# Patient Record
Sex: MALE | Race: WHITE | Employment: FULL TIME | URBAN - METROPOLITAN AREA
[De-identification: names, ages, dates, MRNs, and addresses within clinical notes are randomized per-mention and may not be internally consistent; named-entity substitution may affect disease eponyms.]

---

## 2017-04-25 PROBLEM — Z87.11 H/O GASTRIC ULCER: Status: ACTIVE | Noted: 2017-04-25

## 2017-04-25 PROBLEM — Z98.61 HISTORY OF PTCA: Status: ACTIVE | Noted: 2017-04-25

## 2017-04-25 PROBLEM — E78.5 HYPERLIPIDEMIA: Status: ACTIVE | Noted: 2017-04-25

## 2017-04-25 PROBLEM — Z86.2 H/O: IRON DEFICIENCY ANEMIA: Status: ACTIVE | Noted: 2017-04-25

## 2017-04-25 PROBLEM — I10 ESSENTIAL HYPERTENSION: Status: ACTIVE | Noted: 2017-04-25

## 2017-04-25 PROBLEM — Z87.19 H/O: UPPER GI BLEED: Status: ACTIVE | Noted: 2017-04-25

## 2017-04-25 PROBLEM — Z87.39 H/O: GOUT: Status: ACTIVE | Noted: 2017-04-25

## 2017-04-25 PROBLEM — I25.10 CORONARY ARTERY DISEASE INVOLVING NATIVE CORONARY ARTERY OF NATIVE HEART WITHOUT ANGINA PECTORIS: Status: ACTIVE | Noted: 2017-04-25

## 2017-12-05 PROBLEM — Z86.010 HISTORY OF COLONOSCOPY WITH POLYPECTOMY: Status: ACTIVE | Noted: 2017-12-05

## 2017-12-05 PROBLEM — Z86.0100 HISTORY OF COLONOSCOPY WITH POLYPECTOMY: Status: ACTIVE | Noted: 2017-12-05

## 2017-12-05 PROBLEM — E66.09 NON MORBID OBESITY DUE TO EXCESS CALORIES: Status: ACTIVE | Noted: 2017-12-05

## 2017-12-05 PROBLEM — Z98.890 HISTORY OF COLONOSCOPY WITH POLYPECTOMY: Status: ACTIVE | Noted: 2017-12-05

## 2018-06-18 ENCOUNTER — HOSPITAL ENCOUNTER (EMERGENCY)
Age: 63
Discharge: HOME | End: 2018-06-18
Attending: EMERGENCY MEDICINE
Payer: COMMERCIAL

## 2018-06-18 VITALS
OXYGEN SATURATION: 95 % | HEART RATE: 80 BPM | BODY MASS INDEX: 37.8 KG/M2 | HEIGHT: 71 IN | TEMPERATURE: 97.6 F | DIASTOLIC BLOOD PRESSURE: 67 MMHG | SYSTOLIC BLOOD PRESSURE: 103 MMHG | RESPIRATION RATE: 20 BRPM | WEIGHT: 270 LBS

## 2018-06-18 DIAGNOSIS — E86.0 DEHYDRATION: Primary | ICD-10-CM

## 2018-06-18 DIAGNOSIS — N28.9 ACUTE RENAL INSUFFICIENCY: ICD-10-CM

## 2018-06-18 LAB
ANION GAP SERPL CALCULATED.3IONS-SCNC: 14 MMOL/L (ref 7–16)
BASOPHILS ABSOLUTE: 0.06 E9/L (ref 0–0.2)
BASOPHILS RELATIVE PERCENT: 0.6 % (ref 0–2)
BUN BLDV-MCNC: 30 MG/DL (ref 8–23)
CALCIUM SERPL-MCNC: 9.9 MG/DL (ref 8.6–10.2)
CHLORIDE BLD-SCNC: 96 MMOL/L (ref 98–107)
CO2: 27 MMOL/L (ref 22–29)
CREAT SERPL-MCNC: 2.9 MG/DL (ref 0.7–1.2)
EOSINOPHILS ABSOLUTE: 0.05 E9/L (ref 0.05–0.5)
EOSINOPHILS RELATIVE PERCENT: 0.5 % (ref 0–6)
GFR AFRICAN AMERICAN: 27
GFR NON-AFRICAN AMERICAN: 22 ML/MIN/1.73
GLUCOSE BLD-MCNC: 99 MG/DL (ref 74–109)
HCT VFR BLD CALC: 46.3 % (ref 37–54)
HEMOGLOBIN: 15.9 G/DL (ref 12.5–16.5)
IMMATURE GRANULOCYTES #: 0.07 E9/L
IMMATURE GRANULOCYTES %: 0.7 % (ref 0–5)
LYMPHOCYTES ABSOLUTE: 1.2 E9/L (ref 1.5–4)
LYMPHOCYTES RELATIVE PERCENT: 11.5 % (ref 20–42)
MCH RBC QN AUTO: 30.2 PG (ref 26–35)
MCHC RBC AUTO-ENTMCNC: 34.3 % (ref 32–34.5)
MCV RBC AUTO: 87.9 FL (ref 80–99.9)
MONOCYTES ABSOLUTE: 0.86 E9/L (ref 0.1–0.95)
MONOCYTES RELATIVE PERCENT: 8.3 % (ref 2–12)
NEUTROPHILS ABSOLUTE: 8.16 E9/L (ref 1.8–7.3)
NEUTROPHILS RELATIVE PERCENT: 78.4 % (ref 43–80)
PDW BLD-RTO: 12.9 FL (ref 11.5–15)
PLATELET # BLD: 289 E9/L (ref 130–450)
PMV BLD AUTO: 8.9 FL (ref 7–12)
POTASSIUM SERPL-SCNC: 4.8 MMOL/L (ref 3.5–5)
RBC # BLD: 5.27 E12/L (ref 3.8–5.8)
SODIUM BLD-SCNC: 137 MMOL/L (ref 132–146)
WBC # BLD: 10.4 E9/L (ref 4.5–11.5)

## 2018-06-18 PROCEDURE — 96360 HYDRATION IV INFUSION INIT: CPT

## 2018-06-18 PROCEDURE — 85025 COMPLETE CBC W/AUTO DIFF WBC: CPT

## 2018-06-18 PROCEDURE — 99284 EMERGENCY DEPT VISIT MOD MDM: CPT

## 2018-06-18 PROCEDURE — 2580000003 HC RX 258: Performed by: EMERGENCY MEDICINE

## 2018-06-18 PROCEDURE — 80048 BASIC METABOLIC PNL TOTAL CA: CPT

## 2018-06-18 PROCEDURE — 36415 COLL VENOUS BLD VENIPUNCTURE: CPT

## 2018-06-18 PROCEDURE — 96361 HYDRATE IV INFUSION ADD-ON: CPT

## 2018-06-18 RX ORDER — 0.9 % SODIUM CHLORIDE 0.9 %
1000 INTRAVENOUS SOLUTION INTRAVENOUS ONCE
Status: COMPLETED | OUTPATIENT
Start: 2018-06-18 | End: 2018-06-18

## 2018-06-18 RX ADMIN — SODIUM CHLORIDE 1000 ML: 9 INJECTION, SOLUTION INTRAVENOUS at 19:12

## 2018-06-18 RX ADMIN — SODIUM CHLORIDE 1000 ML: 9 INJECTION, SOLUTION INTRAVENOUS at 20:05

## 2018-06-27 ENCOUNTER — HOSPITAL ENCOUNTER (EMERGENCY)
Age: 63
Discharge: LEFT BEFORE DISCHARGE | End: 2018-06-27
Payer: COMMERCIAL

## 2018-08-06 ENCOUNTER — TELEPHONE (OUTPATIENT)
Dept: CARDIOLOGY CLINIC | Age: 63
End: 2018-08-06

## 2018-08-07 ENCOUNTER — OFFICE VISIT (OUTPATIENT)
Dept: CARDIOLOGY CLINIC | Age: 63
End: 2018-08-07
Payer: COMMERCIAL

## 2018-08-07 ENCOUNTER — HOSPITAL ENCOUNTER (OUTPATIENT)
Age: 63
Discharge: HOME | End: 2018-08-07
Payer: COMMERCIAL

## 2018-08-07 VITALS
WEIGHT: 259 LBS | SYSTOLIC BLOOD PRESSURE: 120 MMHG | HEIGHT: 72 IN | HEART RATE: 71 BPM | DIASTOLIC BLOOD PRESSURE: 60 MMHG | BODY MASS INDEX: 35.08 KG/M2

## 2018-08-07 DIAGNOSIS — R42 DIZZINESS: ICD-10-CM

## 2018-08-07 DIAGNOSIS — R42 DIZZINESS: Primary | ICD-10-CM

## 2018-08-07 DIAGNOSIS — N28.9 RENAL INSUFFICIENCY: ICD-10-CM

## 2018-08-07 DIAGNOSIS — I25.10 CORONARY ARTERY DISEASE INVOLVING NATIVE CORONARY ARTERY OF NATIVE HEART WITHOUT ANGINA PECTORIS: ICD-10-CM

## 2018-08-07 DIAGNOSIS — R07.9 CHEST PAIN, UNSPECIFIED TYPE: ICD-10-CM

## 2018-08-07 DIAGNOSIS — R53.83 OTHER FATIGUE: ICD-10-CM

## 2018-08-07 DIAGNOSIS — R55 PRE-SYNCOPE: ICD-10-CM

## 2018-08-07 LAB
ANION GAP SERPL CALCULATED.3IONS-SCNC: 9 MMOL/L (ref 7–16)
BUN BLDV-MCNC: 23 MG/DL (ref 8–23)
CALCIUM SERPL-MCNC: 9 MG/DL (ref 8.6–10.2)
CHLORIDE BLD-SCNC: 106 MMOL/L (ref 98–107)
CO2: 26 MMOL/L (ref 22–29)
CREAT SERPL-MCNC: 1.2 MG/DL (ref 0.7–1.2)
GFR AFRICAN AMERICAN: >60
GFR NON-AFRICAN AMERICAN: >60 ML/MIN/1.73
GLUCOSE BLD-MCNC: 108 MG/DL (ref 74–109)
HCT VFR BLD CALC: 33.5 % (ref 37–54)
HEMOGLOBIN: 11.1 G/DL (ref 12.5–16.5)
MCH RBC QN AUTO: 27.6 PG (ref 26–35)
MCHC RBC AUTO-ENTMCNC: 33.1 % (ref 32–34.5)
MCV RBC AUTO: 83.3 FL (ref 80–99.9)
PDW BLD-RTO: 13.2 FL (ref 11.5–15)
PLATELET # BLD: 273 E9/L (ref 130–450)
PMV BLD AUTO: 9.3 FL (ref 7–12)
POTASSIUM SERPL-SCNC: 4.2 MMOL/L (ref 3.5–5)
PRO-BNP: 145 PG/ML (ref 0–125)
RBC # BLD: 4.02 E12/L (ref 3.8–5.8)
SODIUM BLD-SCNC: 141 MMOL/L (ref 132–146)
WBC # BLD: 5.5 E9/L (ref 4.5–11.5)

## 2018-08-07 PROCEDURE — 36415 COLL VENOUS BLD VENIPUNCTURE: CPT

## 2018-08-07 PROCEDURE — 99214 OFFICE O/P EST MOD 30 MIN: CPT | Performed by: NURSE PRACTITIONER

## 2018-08-07 PROCEDURE — 83880 ASSAY OF NATRIURETIC PEPTIDE: CPT

## 2018-08-07 PROCEDURE — 85027 COMPLETE CBC AUTOMATED: CPT

## 2018-08-07 PROCEDURE — 93000 ELECTROCARDIOGRAM COMPLETE: CPT | Performed by: INTERNAL MEDICINE

## 2018-08-07 PROCEDURE — 80048 BASIC METABOLIC PNL TOTAL CA: CPT

## 2018-08-07 RX ORDER — LISINOPRIL 5 MG/1
5 TABLET ORAL DAILY
COMMUNITY

## 2018-08-07 RX ORDER — CHOLECALCIFEROL (VITAMIN D3) 1250 MCG
CAPSULE ORAL
COMMUNITY

## 2018-08-07 NOTE — PROGRESS NOTES
OFFICE VISIT        PRIMARY CARE PHYSICIAN:    Krystian Pittman DO       ALLERGIES / SENSITIVITIES:    No Known Allergies       REVIEWED MEDICATIONS:      Current Outpatient Prescriptions:     Cholecalciferol (VITAMIN D3) 33759 units CAPS, Take by mouth, Disp: , Rfl:     lisinopril (PRINIVIL;ZESTRIL) 5 MG tablet, Take 5 mg by mouth daily, Disp: , Rfl:     atorvastatin (LIPITOR) 40 MG tablet, Take 1 tablet by mouth daily, Disp: 90 tablet, Rfl: 3    ranitidine (ZANTAC) 300 MG tablet, TAKE ONE TABLET BY MOUTH TWO TIMES A DAY, Disp: 90 tablet, Rfl: 3    clopidogrel (PLAVIX) 75 MG tablet, TAKE ONE TABLET BY MOUTH EVERY DAY, Disp: 30 tablet, Rfl: 11    pantoprazole (PROTONIX) 40 MG tablet, Take 1 tablet by mouth daily, Disp: 30 tablet, Rfl: 5    Omega-3 Fatty Acids (OMEGA-3 FISH OIL PO), Take by mouth daily , Disp: , Rfl:     docusate sodium (COLACE) 100 MG capsule, Take 100 mg by mouth 2 times daily as needed , Disp: , Rfl:     colchicine 0.6 MG tablet, Take 0.6 mg by mouth 2 times daily , Disp: , Rfl:     Coenzyme Q10 (CO Q 10 PO), Take 100 mg by mouth daily. , Disp: , Rfl:     metoprolol (TOPROL-XL) 25 MG XL tablet, Take 25 mg by mouth daily. , Disp: , Rfl:     nitroGLYCERIN (NITROSTAT) 0.4 MG SL tablet, Place 0.4 mg under the tongue as needed. , Disp: , Rfl:     ferrous sulfate 325 (65 Fe) MG tablet, Take 325 mg by mouth daily (with breakfast), Disp: , Rfl:     DULERA 200-5 MCG/ACT inhaler, , Disp: , Rfl:     Nutritional Supplements (ENSURE COMPLETE SHAKE PO), Take 1 Can by mouth 2 times daily. , Disp: , Rfl:       S: REASON FOR VISIT:    Coronary artery disease. Linda Ayoub is a pleasant, 79-year-old gentleman with cardiovascular history as described below. He is followed here by Dr. Celeste Roth. He was admitted to [facility] on 8/4/2017. He is a  and has been experiencing fatigue and decreased activity tolerance for a few months. 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  He also has been sweating heavily at work and even was admitted to ED [facility] on 6/18/2018 with weakness and his creatinine was 2.9 with a normal HH. His creatinine was 0.9 in 12/2017. He was hydrated and lisinopril was stopped. He followed up with PCP but he does not think more labs were done. The fatigue worsened to the point he thought he would pass out while working in [place] last [address]. He drove home Thurs and had chest pain that lasted one second and he took a nitro. He had no further chest pain. However, he called PCP about the fatigue and was sent to [facility] on Friday. I have limited records but he was admitted and saw cardiology for an elevatedTroponin of 0.1 times 3 with no rise and fall. There were no EKG changes. The cardiologist ordered a bmp and if renal function was normal, CTA of coronaries was to be done. The test was ordered but the machine broke and he left AMA. The BMP is not available. He has no more chest pain, dyspnea, orthopnea, palpitations, dizziness, presyncope, syncope. He wonders if the fatigue could be JOSÉ LUIS related. He follows up routinely for history of iron deficiency anemia with Dr. Debby Hernandez (Hematology-Oncology) but hasn't been there in a while. REVIEW OF SYSTEMS:    CONSTITUTIONAL:  Denies fevers, chills, night sweats   HEENT:  Denies headaches. Denies recent changes in hearing or vision. Denies dysphagia, hoarseness, hemoptysis, hematemesis or epistaxis. ENDOCRINE:  Denies polyphagia, polydipsia or polyuria. Denies heat or cold intolerance. MUSCULOSKELETAL:  He has gout and complains of bilateral pain in his feet. He denies any myalgias. SKIN:  Denies any rashes, ulcers or itching. HEME/LYMPH:  Denies any palpable lymph nodes. He denies bleeding or easy bruisability. HEART:  As above. LUNGS:  Denies any cough or sputum production. GI: Denies abdominal pain, nausea, vomiting, diarrhea, constipation, rectal bleeding or tarry stools.  He does have a history of

## 2018-08-13 ENCOUNTER — TELEPHONE (OUTPATIENT)
Dept: CARDIOLOGY CLINIC | Age: 63
End: 2018-08-13

## 2018-08-13 NOTE — TELEPHONE ENCOUNTER
Pt called pre service to cancel stress test and follow up with Dr. Henrry Ruiz.  Pt is going to [place] Dr. Tena Eisenmenger for second opinion

## 2018-10-14 ENCOUNTER — HOSPITAL ENCOUNTER (EMERGENCY)
Age: 63
Discharge: HOME | End: 2018-10-14
Payer: COMMERCIAL

## 2018-10-14 VITALS
SYSTOLIC BLOOD PRESSURE: 137 MMHG | DIASTOLIC BLOOD PRESSURE: 84 MMHG | RESPIRATION RATE: 20 BRPM | OXYGEN SATURATION: 97 % | HEART RATE: 64 BPM | TEMPERATURE: 97.8 F

## 2018-10-14 DIAGNOSIS — J06.9 URI WITH COUGH AND CONGESTION: Primary | ICD-10-CM

## 2018-10-14 PROCEDURE — 99212 OFFICE O/P EST SF 10 MIN: CPT

## 2018-10-14 RX ORDER — DEXTROMETHORPHAN HYDROBROMIDE AND PROMETHAZINE HYDROCHLORIDE 15; 6.25 MG/5ML; MG/5ML
5 SYRUP ORAL 4 TIMES DAILY PRN
Qty: 180 ML | Refills: 0 | Status: SHIPPED | OUTPATIENT
Start: 2018-10-14 | End: 2018-10-21

## 2018-10-14 RX ORDER — METHYLPREDNISOLONE 4 MG/1
4 TABLET ORAL SEE ADMIN INSTRUCTIONS
COMMUNITY
End: 2019-08-29

## 2018-10-14 RX ORDER — FLUTICASONE PROPIONATE 110 UG/1
1 AEROSOL, METERED RESPIRATORY (INHALATION) 2 TIMES DAILY
Qty: 1 INHALER | Refills: 3 | Status: SHIPPED | OUTPATIENT
Start: 2018-10-14 | End: 2019-06-19

## 2018-10-14 RX ORDER — CEFDINIR 300 MG/1
300 CAPSULE ORAL 2 TIMES DAILY
Qty: 14 CAPSULE | Refills: 0 | Status: SHIPPED | OUTPATIENT
Start: 2018-10-14 | End: 2018-10-21

## 2018-10-14 RX ORDER — FLUTICASONE PROPIONATE 50 MCG
1 SPRAY, SUSPENSION (ML) NASAL DAILY
Qty: 1 BOTTLE | Refills: 0 | Status: SHIPPED | OUTPATIENT
Start: 2018-10-14 | End: 2019-06-19

## 2018-10-14 NOTE — ED PROVIDER NOTES
Department of Emergency Medicine   ED  Provider Note  Admit Date/RoomTime: 10/14/2018 12:36 PM  ED Room: 07/07    Chief Complaint:   URI (c/o sinus pressure, congestion and drainage w productive cough x a few days)    History of Present Illness      Amilcar Reyes is a 58 y.o. old male who presents to the ED for Sinus pressure, cough, and congestion that has been ongoing for the past few days. Patient states he is an over the road  and wanted to get treated before he went out of town. He denies any chest pain or shortness of breath. He has no abdominal pain, nausea, vomiting, diarrhea, limb pain or swelling, fever/chills, back pain, neck pain, rash, or recent trauma/injury. Patient is alert and oriented ×3 and in no apparent distress at this exam. Patient states his cough is occasionally productive. ROS   Pertinent positives and negatives are stated within HPI, all other systems reviewed and are negative. Past Medical History:  has a past medical history of Anemia; CAD (coronary artery disease); Gastric ulcer; Gout; Hyperlipidemia; and Hypertension. Past Surgical History:  has a past surgical history that includes Cardiac surgery (stent placement); Colonoscopy; Diagnostic Cardiac Cath Lab Procedure (06/15/2007); and Diagnostic Cardiac Cath Lab Procedure (07/14/2008). Social History:  reports that he has quit smoking. He has never used smokeless tobacco. He reports that he does not drink alcohol or use drugs. Family History: family history includes Arrhythmia in his maternal grandmother; Heart Disease in his brother and mother. The patients home medications have been reviewed. Allergies: Patient has no known allergies. Allergies have been reviewed with patient. Physical Exam   VS:  /84   Pulse 64   Temp 97.8 °F (36.6 °C) (Oral)   Resp 20   SpO2 97%      Oxygen Saturation Interpretation: Normal.    Constitutional:  Alert, development consistent with age.

## 2018-11-21 RX ORDER — ATORVASTATIN CALCIUM 40 MG/1
40 TABLET, FILM COATED ORAL DAILY
Qty: 90 TABLET | Refills: 3 | Status: SHIPPED | OUTPATIENT
Start: 2018-11-21 | End: 2018-11-21 | Stop reason: SDUPTHER

## 2018-11-22 RX ORDER — ATORVASTATIN CALCIUM 40 MG/1
40 TABLET, FILM COATED ORAL DAILY
Qty: 90 TABLET | Refills: 3 | Status: SHIPPED | OUTPATIENT
Start: 2018-11-22

## 2019-05-20 DIAGNOSIS — D64.9 ANEMIA, UNSPECIFIED TYPE: Primary | ICD-10-CM

## 2019-05-21 ENCOUNTER — HOSPITAL ENCOUNTER (EMERGENCY)
Age: 64
Discharge: HOME | End: 2019-05-21
Attending: EMERGENCY MEDICINE
Payer: COMMERCIAL

## 2019-05-21 VITALS
DIASTOLIC BLOOD PRESSURE: 76 MMHG | OXYGEN SATURATION: 98 % | TEMPERATURE: 97.4 F | HEIGHT: 72 IN | WEIGHT: 240 LBS | HEART RATE: 84 BPM | RESPIRATION RATE: 18 BRPM | BODY MASS INDEX: 32.51 KG/M2 | SYSTOLIC BLOOD PRESSURE: 114 MMHG

## 2019-05-21 DIAGNOSIS — M70.21 OLECRANON BURSITIS OF RIGHT ELBOW: Primary | ICD-10-CM

## 2019-05-21 PROCEDURE — 6370000000 HC RX 637 (ALT 250 FOR IP): Performed by: NURSE PRACTITIONER

## 2019-05-21 PROCEDURE — 20600 DRAIN/INJ JOINT/BURSA W/O US: CPT

## 2019-05-21 PROCEDURE — 99283 EMERGENCY DEPT VISIT LOW MDM: CPT

## 2019-05-21 RX ORDER — NAPROXEN 500 MG/1
500 TABLET ORAL 2 TIMES DAILY
Qty: 14 TABLET | Refills: 0 | Status: SHIPPED | OUTPATIENT
Start: 2019-05-21 | End: 2019-05-28

## 2019-05-21 RX ORDER — NAPROXEN 500 MG/1
500 TABLET ORAL ONCE
Status: COMPLETED | OUTPATIENT
Start: 2019-05-21 | End: 2019-05-21

## 2019-05-21 RX ADMIN — NAPROXEN 500 MG: 500 TABLET ORAL at 01:49

## 2019-05-21 ASSESSMENT — PAIN DESCRIPTION - DESCRIPTORS: DESCRIPTORS: DULL;SHARP

## 2019-05-21 ASSESSMENT — PAIN DESCRIPTION - LOCATION: LOCATION: ELBOW

## 2019-05-21 ASSESSMENT — PAIN SCALES - GENERAL: PAINLEVEL_OUTOF10: 0

## 2019-05-21 ASSESSMENT — PAIN DESCRIPTION - FREQUENCY: FREQUENCY: INTERMITTENT

## 2019-05-21 ASSESSMENT — PAIN DESCRIPTION - ORIENTATION: ORIENTATION: RIGHT

## 2019-05-21 ASSESSMENT — PAIN DESCRIPTION - PAIN TYPE: TYPE: ACUTE PAIN

## 2019-05-21 NOTE — ED PROVIDER NOTES
[facility]     Department of Emergency Medicine   ED  Provider Note  Admit Date/RoomTime: 5/21/2019  1:05 AM  ED Room: [place] [address]   Chief Complaint     Elbow Pain (R elbow pain x couple days states fluid on elbow)    History of Present Illness   Source of history provided by:  patient and spouse/SO. History/Exam Limitations: none       Xena Mosley is a 58 y.o. old male who has a past medical history of:   Past Medical History:   Diagnosis Date    Anemia     CAD (coronary artery disease)     Gastric ulcer     Gout     Hyperlipidemia     Hypertension    presents to the emergency department by private vehicle, for Right elbow pain and swelling which occured 2 day(s) prior to arrival.  Cause of complaint: Possibly resting his elbow and his truck or history of gout while driving. The symptoms are associated with swelling. There has been a history of no prior problems with this area in the past.   He is right handed. The patients tetanus status is unknown. Since onset the symptoms have been mild in degree. His pain is aggraveated by palpation and relieved by nothing. ROS   Pertinent positives and negatives are stated within HPI, all other systems reviewed and are negative. Past Surgical History:   Procedure Laterality Date    CARDIAC SURGERY  stent placement    COLONOSCOPY      DIAGNOSTIC CARDIAC CATH LAB PROCEDURE  06/15/2007    [facility]: Severe two-vessel disease in LAD and LCX in left-dominant system. PCI to the LCX with BMS and LAD with PES     DIAGNOSTIC CARDIAC CATH LAB PROCEDURE  07/14/2008    [facility]: LM Normal. LAD mild disease with severe ostial lesion of diagonal branch. Mild LAD stent patent without instent restenosis, LCX with mild proximal disease and complete occlusion in middle segment, RCA nondominant with severe disease or proximal segment. Successful PCI of mid CX instent restenosis with ZES and proximal RCA ZES. Social History:  reports that he has quit smoking.  He has alternatives (for applicable procedures below) described. Performed By: Dr Dakotah Stein. Indication:  Olecranon bursitis. Informed consent: by patient or legal gardian. Prep:  The patient was positioned appropriately and the landmarks were identified. The skin was cleansed with povidone iodine and draped in a sterile fashion. Anesthetic: The wound area was anesthetized with not required. Arthrocentesis:  right olecranon bursa aspiration was performed using a 18 gauge needle and 0 cc of fluid was drained. Culture sent:  no.  A sterile dressing was then applied to the site. Complications: None. The patient tolerated the procedure well. MDM:   Films were not obtained based on low suspicion for bony injury as per history/physical findings. Patient has clinical presentation of noninfected right olecranon bursitis. Aspiration was attempted, but 0 mL of fluid was drained. Plan is subsequently for symptom control, limited use and  immobilization with appropriate outpatient follow-up. Patient and wife are instructed to return to the emergency department immediately if any new or worsening symptoms. Counseling: The emergency provider has spoken with the patient and spouse/SO and discussed todays results, in addition to providing specific details for the plan of care and counseling regarding the diagnosis and prognosis. Questions are answered at this time and they are agreeable with the plan. Assessment      1. Olecranon bursitis of right elbow      Plan   Discharge to home  Patient condition is good    New Medications     New Prescriptions    NAPROXEN (NAPROSYN) 500 MG TABLET    Take 1 tablet by mouth 2 times daily for 7 days     Electronically signed by DEBORA Hoover CNP   DD: 5/21/19  **This report was transcribed using voice recognition software. Every effort was made to ensure accuracy; however, inadvertent computerized transcription errors may be present.   END OF ED PROVIDER NOTE

## 2019-06-18 NOTE — PROGRESS NOTES
[address] [facility] [address] [facility] [place]        Pt Name: Trisha Senior: 1955  Date of evaluation: 6/18/2019  Primary Care Physician: Joya Howe DO  Reason for evaluation:   Chief Complaint   Patient presents with    Anemia    6 Month Follow-Up        Subjective: Here for follow up.   Feels well today.  No new c/o's  He denies any melena or heartburn. He had been taking oral iron supplements along with vitamin C    OBJECTIVE:  VITALS:  height is 6' (1.829 m) and weight is 247 lb 1.6 oz (112.1 kg). His oral temperature is 97.8 °F (36.6 °C). His blood pressure is 144/91 (abnormal) and his pulse is 72. Physical Exam:  Performance Status: 0  Well developed, well nourished male  EYES: sclera non-icteric   ENT: oropharynx clear. NECK: No lymphadenopathy. HEART: Regular rate and rhythm. LUNGS: Clear   ABDOMEN: Soft, nontender. No ascites. No mass or organomegaly. EXTREMITIES: without edema    NEUROLOGIC: No focal deficits. SKIN: No Rash.          Medications  Prior to Admission medications    Medication Sig Start Date End Date Taking? Authorizing Provider   naproxen (NAPROSYN) 500 MG tablet Take 1 tablet by mouth 2 times daily for 7 days 5/21/19 5/28/19  DEBORA Cordova - CNP   atorvastatin (LIPITOR) 40 MG tablet Take 1 tablet by mouth daily 11/22/18   Lizzette Ramos MD   methylPREDNISolone (MEDROL DOSEPACK) 4 MG tablet Take 4 mg by mouth See Admin Instructions Take by mouth.     Historical Provider, MD   fluticasone (FLOVENT HFA) 110 MCG/ACT inhaler Inhale 1 puff into the lungs 2 times daily 10/14/18   Kimo Barlow PA-C   fluticasone [facility]) 50 MCG/ACT nasal spray 1 spray by Each Nare route daily 10/14/18   Kimo Barlow PA-C   Cholecalciferol (VITAMIN D3) 23767 units CAPS Take by mouth    Historical Provider, MD   lisinopril (PRINIVIL;ZESTRIL) 5 MG tablet Take 5 mg by mouth daily    Historical Provider, MD   ferrous sulfate 325 (65 Fe) MG tablet Take 325 mg by mouth daily (with breakfast)    Historical Provider, MD   ranitidine (ZANTAC) 300 MG tablet TAKE ONE TABLET BY MOUTH TWO TIMES A DAY 11/14/17   Brittney Perkins MD   clopidogrel (PLAVIX) 75 MG tablet TAKE ONE TABLET BY MOUTH EVERY DAY 10/12/17   Brittney Perkins MD   pantoprazole (PROTONIX) 40 MG tablet Take 1 tablet by mouth daily 4/25/17   Brittney Perkins MD   Omega-3 Fatty Acids (OMEGA-3 FISH OIL PO) Take by mouth daily     Historical Provider, MD   Nutritional Supplements (ENSURE COMPLETE SHAKE PO) Take 1 Can by mouth 2 times daily. 2/11/15 3/13/17  Historical Provider, MD   docusate sodium (COLACE) 100 MG capsule Take 100 mg by mouth 2 times daily as needed     Historical Provider, MD   colchicine 0.6 MG tablet Take 0.6 mg by mouth 2 times daily     Historical Provider, MD   Coenzyme Q10 (CO Q 10 PO) Take 100 mg by mouth daily. Historical Provider, MD   metoprolol (TOPROL-XL) 25 MG XL tablet Take 25 mg by mouth daily. Historical Provider, MD   nitroGLYCERIN (NITROSTAT) 0.4 MG SL tablet Place 0.4 mg under the tongue as needed.       Historical Provider, MD    Scheduled Meds:  Continuous Infusions:  PRN Meds:.        Recent Laboratory Data-     Lab Results   Component Value Date    WBC 9.6 06/19/2019    HGB 13.9 06/19/2019    HCT 41.7 06/19/2019    MCV 86.7 06/19/2019     06/19/2019    LYMPHOPCT 10.9 (L) 06/19/2019    RBC 4.81 06/19/2019    MCH 28.9 06/19/2019    MCHC 33.3 06/19/2019    RDW 14.6 06/19/2019    NEUTOPHILPCT 79.2 06/19/2019    MONOPCT 7.2 06/19/2019    EOSPCT 6 10/15/2010    BASOPCT 0.3 06/19/2019    NEUTROABS 7.59 (H) 06/19/2019    LYMPHSABS 1.05 (L) 06/19/2019    MONOSABS 0.69 06/19/2019    EOSABS 0.20 06/19/2019    BASOSABS 0.03 06/19/2019       Lab Results   Component Value Date     08/07/2018    K 4.2 08/07/2018     08/07/2018    CO2 26 08/07/2018    BUN 23 08/07/2018    CREATININE 1.2 08/07/2018    GLUCOSE 108 08/07/2018    CALCIUM 9.0 08/07/2018    PROT 6.8 12/13/2017    LABALBU 4.1 12/13/2017    BILITOT 0.6 12/13/2017    ALKPHOS 82 12/13/2017    AST 22 12/13/2017    ALT 26 12/13/2017    LABGLOM >60 08/07/2018    GFRAA >60 08/07/2018         Lab Results   Component Value Date    IRON 194 (H) 12/13/2017    TIBC 360 12/13/2017    FERRITIN 28 12/13/2017           Radiology-  No results found. ASSESSMENT/PLAN :  A 71-year-old man with prior history of iron deficiency anemia had been previously followed by Dr. Primo Benjamin  His follow up CBC in February 2015 showed a sudden acute drop in his hemoglobin to 9.1 . He has been on oral iron supplements 325 mg by mouth 3 times a day and has been intolerant with dyspepsia constipation and dark stools . Apparently in November he had a colonoscopy by Dr. Sidney Treviño and benign polyps were removed and there was no active bleeding he also had a capsule enteroscopy and per patient it was negative. His last EGD in 2014 had shown healing of his gastric ulcer.      Back in JAN 2015 he had bronchitis and flu like symptoms and took steroids and antibiotics. He responded to IV Iron and his Hb went up to normal range. His hemoglobin remains stable and in the normal range  He did have few months ago a repeat EGD by Dr. Wilber Alpers and results will be retrieved. He was recommended to continue on ranitidine and pantoprazole  It was explained to him that his oral iron absorption will be impaired due to the fact that he is on PPI with lack of stomach acidity. Should his hemoglobin dropped again below 11 he will benefit from parenteral IV iron   His last dose of IV iron was in February 2015  Hgb 13.6 on 9/12/16. Selvin Hogue has been reassured.   No treatment warranted      Hgb was 12.2  on 9/13/17  He has been reassured.     He will be recommended oral iron supplements along with vitamin C to improve bioavailability and if his hemoglobin drops again he will be resumed on parenteral IV iron       Hgb was 13.5 on12/13/17  No treatment warranted     Hgb is  13.9 today----6/19/19  No treatment warranted      Con Roam. Virginia Langston M.D., F.A.C.P.   Electronically signed 6/18/2019 at 9:47 AM

## 2019-06-19 ENCOUNTER — HOSPITAL ENCOUNTER (OUTPATIENT)
Dept: INFUSION THERAPY | Age: 64
Discharge: HOME | End: 2019-06-19
Payer: COMMERCIAL

## 2019-06-19 ENCOUNTER — OFFICE VISIT (OUTPATIENT)
Dept: ONCOLOGY | Age: 64
End: 2019-06-19
Payer: COMMERCIAL

## 2019-06-19 VITALS
DIASTOLIC BLOOD PRESSURE: 91 MMHG | HEART RATE: 72 BPM | SYSTOLIC BLOOD PRESSURE: 144 MMHG | TEMPERATURE: 97.8 F | HEIGHT: 72 IN | WEIGHT: 247.1 LBS | BODY MASS INDEX: 33.47 KG/M2

## 2019-06-19 DIAGNOSIS — D64.9 ANEMIA, UNSPECIFIED TYPE: ICD-10-CM

## 2019-06-19 DIAGNOSIS — D50.0 IRON DEFICIENCY ANEMIA DUE TO CHRONIC BLOOD LOSS: Primary | ICD-10-CM

## 2019-06-19 LAB
ALBUMIN SERPL-MCNC: 4.3 G/DL (ref 3.5–5.2)
ALP BLD-CCNC: 108 U/L (ref 40–129)
ALT SERPL-CCNC: 19 U/L (ref 0–40)
ANION GAP SERPL CALCULATED.3IONS-SCNC: 10 MMOL/L (ref 7–16)
AST SERPL-CCNC: 18 U/L (ref 0–39)
BASOPHILS ABSOLUTE: 0.03 E9/L (ref 0–0.2)
BASOPHILS RELATIVE PERCENT: 0.3 % (ref 0–2)
BILIRUB SERPL-MCNC: 0.3 MG/DL (ref 0–1.2)
BUN BLDV-MCNC: 27 MG/DL (ref 8–23)
CALCIUM SERPL-MCNC: 9.4 MG/DL (ref 8.6–10.2)
CHLORIDE BLD-SCNC: 108 MMOL/L (ref 98–107)
CO2: 26 MMOL/L (ref 22–29)
CREAT SERPL-MCNC: 1.1 MG/DL (ref 0.7–1.2)
EOSINOPHILS ABSOLUTE: 0.2 E9/L (ref 0.05–0.5)
EOSINOPHILS RELATIVE PERCENT: 2.1 % (ref 0–6)
FERRITIN: 38 NG/ML
GFR AFRICAN AMERICAN: >60
GFR NON-AFRICAN AMERICAN: >60 ML/MIN/1.73
GLUCOSE BLD-MCNC: 99 MG/DL (ref 74–99)
HCT VFR BLD CALC: 41.7 % (ref 37–54)
HEMOGLOBIN: 13.9 G/DL (ref 12.5–16.5)
IMMATURE GRANULOCYTES #: 0.03 E9/L
IMMATURE GRANULOCYTES %: 0.3 % (ref 0–5)
IRON SATURATION: 14 % (ref 20–55)
IRON: 43 MCG/DL (ref 59–158)
LYMPHOCYTES ABSOLUTE: 1.05 E9/L (ref 1.5–4)
LYMPHOCYTES RELATIVE PERCENT: 10.9 % (ref 20–42)
MCH RBC QN AUTO: 28.9 PG (ref 26–35)
MCHC RBC AUTO-ENTMCNC: 33.3 % (ref 32–34.5)
MCV RBC AUTO: 86.7 FL (ref 80–99.9)
MONOCYTES ABSOLUTE: 0.69 E9/L (ref 0.1–0.95)
MONOCYTES RELATIVE PERCENT: 7.2 % (ref 2–12)
NEUTROPHILS ABSOLUTE: 7.59 E9/L (ref 1.8–7.3)
NEUTROPHILS RELATIVE PERCENT: 79.2 % (ref 43–80)
PDW BLD-RTO: 14.6 FL (ref 11.5–15)
PLATELET # BLD: 237 E9/L (ref 130–450)
PMV BLD AUTO: 9 FL (ref 7–12)
POTASSIUM SERPL-SCNC: 4.6 MMOL/L (ref 3.5–5)
RBC # BLD: 4.81 E12/L (ref 3.8–5.8)
SODIUM BLD-SCNC: 144 MMOL/L (ref 132–146)
TOTAL IRON BINDING CAPACITY: 318 MCG/DL (ref 250–450)
TOTAL PROTEIN: 7.1 G/DL (ref 6.4–8.3)
WBC # BLD: 9.6 E9/L (ref 4.5–11.5)

## 2019-06-19 PROCEDURE — 36415 COLL VENOUS BLD VENIPUNCTURE: CPT

## 2019-06-19 PROCEDURE — 80053 COMPREHEN METABOLIC PANEL: CPT

## 2019-06-19 PROCEDURE — 85025 COMPLETE CBC W/AUTO DIFF WBC: CPT

## 2019-06-19 PROCEDURE — 83550 IRON BINDING TEST: CPT

## 2019-06-19 PROCEDURE — 82728 ASSAY OF FERRITIN: CPT

## 2019-06-19 PROCEDURE — 83540 ASSAY OF IRON: CPT

## 2019-06-19 PROCEDURE — 99212 OFFICE O/P EST SF 10 MIN: CPT | Performed by: INTERNAL MEDICINE

## 2019-08-29 ENCOUNTER — HOSPITAL ENCOUNTER (EMERGENCY)
Age: 64
Discharge: HOME | End: 2019-08-29
Payer: COMMERCIAL

## 2019-08-29 ENCOUNTER — HOSPITAL ENCOUNTER (EMERGENCY)
Age: 64
Discharge: LEFT BEFORE DISCHARGE | End: 2019-08-29
Payer: COMMERCIAL

## 2019-08-29 VITALS
HEART RATE: 79 BPM | SYSTOLIC BLOOD PRESSURE: 104 MMHG | TEMPERATURE: 97.7 F | RESPIRATION RATE: 16 BRPM | BODY MASS INDEX: 32.55 KG/M2 | DIASTOLIC BLOOD PRESSURE: 75 MMHG | WEIGHT: 240 LBS | OXYGEN SATURATION: 95 %

## 2019-08-29 VITALS
DIASTOLIC BLOOD PRESSURE: 75 MMHG | HEART RATE: 64 BPM | RESPIRATION RATE: 20 BRPM | TEMPERATURE: 98.3 F | WEIGHT: 240 LBS | HEIGHT: 72 IN | SYSTOLIC BLOOD PRESSURE: 120 MMHG | OXYGEN SATURATION: 97 % | BODY MASS INDEX: 32.51 KG/M2

## 2019-08-29 DIAGNOSIS — R10.9 ABDOMINAL PAIN, UNSPECIFIED ABDOMINAL LOCATION: Primary | ICD-10-CM

## 2019-08-29 PROCEDURE — 99212 OFFICE O/P EST SF 10 MIN: CPT

## 2019-08-29 PROCEDURE — 4500000002 HC ER NO CHARGE

## 2019-08-30 ENCOUNTER — APPOINTMENT (OUTPATIENT)
Dept: CT IMAGING | Age: 64
End: 2019-08-30
Payer: COMMERCIAL

## 2019-08-30 ENCOUNTER — HOSPITAL ENCOUNTER (EMERGENCY)
Age: 64
Discharge: HOME | End: 2019-08-30
Attending: EMERGENCY MEDICINE
Payer: COMMERCIAL

## 2019-08-30 VITALS
HEIGHT: 72 IN | RESPIRATION RATE: 18 BRPM | SYSTOLIC BLOOD PRESSURE: 137 MMHG | WEIGHT: 245.9 LBS | TEMPERATURE: 98.1 F | HEART RATE: 78 BPM | DIASTOLIC BLOOD PRESSURE: 85 MMHG | OXYGEN SATURATION: 97 % | BODY MASS INDEX: 33.31 KG/M2

## 2019-08-30 DIAGNOSIS — R10.9 ABDOMINAL PAIN, UNSPECIFIED ABDOMINAL LOCATION: Primary | ICD-10-CM

## 2019-08-30 DIAGNOSIS — N40.0 PROSTATE ENLARGEMENT: ICD-10-CM

## 2019-08-30 LAB
ALBUMIN SERPL-MCNC: 4.2 G/DL (ref 3.5–5.2)
ALP BLD-CCNC: 71 U/L (ref 40–129)
ALT SERPL-CCNC: 20 U/L (ref 0–40)
ANION GAP SERPL CALCULATED.3IONS-SCNC: 12 MMOL/L (ref 7–16)
AST SERPL-CCNC: 19 U/L (ref 0–39)
BACTERIA: ABNORMAL /HPF
BILIRUB SERPL-MCNC: 1.1 MG/DL (ref 0–1.2)
BILIRUBIN DIRECT: 0.3 MG/DL (ref 0–0.3)
BILIRUBIN URINE: NEGATIVE
BILIRUBIN, INDIRECT: 0.8 MG/DL (ref 0–1)
BLOOD, URINE: ABNORMAL
BUN BLDV-MCNC: 20 MG/DL (ref 8–23)
CALCIUM SERPL-MCNC: 9.3 MG/DL (ref 8.6–10.2)
CHLORIDE BLD-SCNC: 103 MMOL/L (ref 98–107)
CLARITY: CLEAR
CO2: 26 MMOL/L (ref 22–29)
COLOR: YELLOW
CREAT SERPL-MCNC: 1.2 MG/DL (ref 0.7–1.2)
EPITHELIAL CELLS, UA: ABNORMAL /HPF
GFR AFRICAN AMERICAN: >60
GFR NON-AFRICAN AMERICAN: >60 ML/MIN/1.73
GLUCOSE BLD-MCNC: 109 MG/DL (ref 74–99)
GLUCOSE URINE: NEGATIVE MG/DL
HCT VFR BLD CALC: 44.7 % (ref 37–54)
HEMOGLOBIN: 14.4 G/DL (ref 12.5–16.5)
KETONES, URINE: NEGATIVE MG/DL
LACTIC ACID: 0.8 MMOL/L (ref 0.5–2.2)
LEUKOCYTE ESTERASE, URINE: NEGATIVE
LIPASE: 41 U/L (ref 13–60)
MCH RBC QN AUTO: 28.3 PG (ref 26–35)
MCHC RBC AUTO-ENTMCNC: 32.2 % (ref 32–34.5)
MCV RBC AUTO: 88 FL (ref 80–99.9)
NITRITE, URINE: NEGATIVE
PDW BLD-RTO: 14 FL (ref 11.5–15)
PH UA: 5.5 (ref 5–9)
PLATELET # BLD: 213 E9/L (ref 130–450)
PMV BLD AUTO: 9.1 FL (ref 7–12)
POTASSIUM SERPL-SCNC: 4.2 MMOL/L (ref 3.5–5)
PROTEIN UA: NEGATIVE MG/DL
RBC # BLD: 5.08 E12/L (ref 3.8–5.8)
RBC UA: ABNORMAL /HPF (ref 0–2)
SODIUM BLD-SCNC: 141 MMOL/L (ref 132–146)
SPECIFIC GRAVITY UA: 1.02 (ref 1–1.03)
TOTAL PROTEIN: 7.4 G/DL (ref 6.4–8.3)
TROPONIN: <0.01 NG/ML (ref 0–0.03)
UROBILINOGEN, URINE: 0.2 E.U./DL
WBC # BLD: 6.2 E9/L (ref 4.5–11.5)
WBC UA: ABNORMAL /HPF (ref 0–5)

## 2019-08-30 PROCEDURE — 85027 COMPLETE CBC AUTOMATED: CPT

## 2019-08-30 PROCEDURE — 81001 URINALYSIS AUTO W/SCOPE: CPT

## 2019-08-30 PROCEDURE — 83690 ASSAY OF LIPASE: CPT

## 2019-08-30 PROCEDURE — 74177 CT ABD & PELVIS W/CONTRAST: CPT

## 2019-08-30 PROCEDURE — 83605 ASSAY OF LACTIC ACID: CPT

## 2019-08-30 PROCEDURE — 93005 ELECTROCARDIOGRAM TRACING: CPT | Performed by: EMERGENCY MEDICINE

## 2019-08-30 PROCEDURE — 80048 BASIC METABOLIC PNL TOTAL CA: CPT

## 2019-08-30 PROCEDURE — 80076 HEPATIC FUNCTION PANEL: CPT

## 2019-08-30 PROCEDURE — 6360000004 HC RX CONTRAST MEDICATION: Performed by: RADIOLOGY

## 2019-08-30 PROCEDURE — 99284 EMERGENCY DEPT VISIT MOD MDM: CPT

## 2019-08-30 PROCEDURE — 36415 COLL VENOUS BLD VENIPUNCTURE: CPT

## 2019-08-30 PROCEDURE — 84484 ASSAY OF TROPONIN QUANT: CPT

## 2019-08-30 RX ORDER — SODIUM CHLORIDE 0.9 % (FLUSH) 0.9 %
10 SYRINGE (ML) INJECTION PRN
Status: DISCONTINUED | OUTPATIENT
Start: 2019-08-30 | End: 2019-08-30 | Stop reason: HOSPADM

## 2019-08-30 RX ORDER — SODIUM CHLORIDE 0.9 % (FLUSH) 0.9 %
10 SYRINGE (ML) INJECTION PRN
Status: DISCONTINUED | OUTPATIENT
Start: 2019-08-30 | End: 2019-08-30

## 2019-08-30 RX ADMIN — IOPAMIDOL 80 ML: 755 INJECTION, SOLUTION INTRAVENOUS at 13:12

## 2019-08-30 ASSESSMENT — ENCOUNTER SYMPTOMS
SHORTNESS OF BREATH: 0
WHEEZING: 0
EYE PAIN: 0
SINUS PRESSURE: 0
NAUSEA: 0
DIARRHEA: 1
ABDOMINAL PAIN: 1
BACK PAIN: 0
COUGH: 0
EYE REDNESS: 0
EYE DISCHARGE: 0
VOMITING: 0
SORE THROAT: 0

## 2019-08-30 ASSESSMENT — PAIN DESCRIPTION - DESCRIPTORS: DESCRIPTORS: CRAMPING

## 2019-08-30 ASSESSMENT — PAIN DESCRIPTION - ORIENTATION: ORIENTATION: MID

## 2019-08-30 ASSESSMENT — PAIN DESCRIPTION - FREQUENCY: FREQUENCY: CONTINUOUS

## 2019-08-30 ASSESSMENT — PAIN DESCRIPTION - LOCATION: LOCATION: ABDOMEN

## 2019-08-30 ASSESSMENT — PAIN DESCRIPTION - PAIN TYPE: TYPE: ACUTE PAIN

## 2019-08-30 ASSESSMENT — PAIN SCALES - GENERAL: PAINLEVEL_OUTOF10: 5

## 2019-08-30 NOTE — ED PROVIDER NOTES
no wheezes. He has no rales. Abdominal: Soft. Bowel sounds are increased. There is no hepatosplenomegaly, splenomegaly or hepatomegaly. There is tenderness in the epigastric area and left upper quadrant. There is no rigidity, no rebound, no guarding and no CVA tenderness. Musculoskeletal: He exhibits no edema. Neurological: He is alert and oriented to person, place, and time. No cranial nerve deficit. Coordination normal.   Skin: Skin is warm and dry. Nursing note and vitals reviewed. Procedures     MDM       EKG: This EKG is signed and interpreted by me. Rate: 7  Rhythm: Sinus  Interpretation: no acute changes and non-specific EKG  Comparison: stable as compared to patient's most recent EKG           --------------------------------------------- PAST HISTORY ---------------------------------------------  Past Medical History:  has a past medical history of Anemia, CAD (coronary artery disease), Gastric ulcer, Gout, Hyperlipidemia, and Hypertension. Past Surgical History:  has a past surgical history that includes Cardiac surgery (stent placement); Colonoscopy; Diagnostic Cardiac Cath Lab Procedure (06/15/2007); and Diagnostic Cardiac Cath Lab Procedure (07/14/2008). Social History:  reports that he has quit smoking. He has never used smokeless tobacco. He reports that he does not drink alcohol or use drugs. Family History: family history includes Arrhythmia in his maternal grandmother; Heart Disease in his brother and mother. The patients home medications have been reviewed. Allergies: Patient has no known allergies.     -------------------------------------------------- RESULTS -------------------------------------------------  Labs:  Results for orders placed or performed during the hospital encounter of 16/14/29   Basic metabolic panel   Result Value Ref Range    Sodium 141 132 - 146 mmol/L    Potassium 4.2 3.5 - 5.0 mmol/L    Chloride 103 98 - 107 mmol/L    CO2 26 22 - 29

## 2019-08-31 LAB
EKG ATRIAL RATE: 70 BPM
EKG P AXIS: 61 DEGREES
EKG P-R INTERVAL: 180 MS
EKG Q-T INTERVAL: 390 MS
EKG QRS DURATION: 106 MS
EKG QTC CALCULATION (BAZETT): 421 MS
EKG R AXIS: 63 DEGREES
EKG T AXIS: 54 DEGREES
EKG VENTRICULAR RATE: 70 BPM

## 2019-10-12 ENCOUNTER — HOSPITAL ENCOUNTER (EMERGENCY)
Age: 64
Discharge: HOME | End: 2019-10-12
Payer: COMMERCIAL

## 2019-10-12 VITALS
WEIGHT: 240 LBS | OXYGEN SATURATION: 96 % | BODY MASS INDEX: 32.55 KG/M2 | RESPIRATION RATE: 16 BRPM | DIASTOLIC BLOOD PRESSURE: 77 MMHG | HEART RATE: 66 BPM | SYSTOLIC BLOOD PRESSURE: 120 MMHG | TEMPERATURE: 97.6 F

## 2019-10-12 DIAGNOSIS — J06.9 UPPER RESPIRATORY TRACT INFECTION, UNSPECIFIED TYPE: Primary | ICD-10-CM

## 2019-10-12 DIAGNOSIS — J40 BRONCHITIS: ICD-10-CM

## 2019-10-12 PROCEDURE — 99212 OFFICE O/P EST SF 10 MIN: CPT

## 2019-10-12 RX ORDER — AZITHROMYCIN 250 MG/1
TABLET, FILM COATED ORAL
Qty: 6 TABLET | Refills: 0 | Status: SHIPPED | OUTPATIENT
Start: 2019-10-12 | End: 2019-10-22

## 2019-10-12 RX ORDER — PREDNISONE 10 MG/1
40 TABLET ORAL DAILY
Qty: 20 TABLET | Refills: 0 | Status: SHIPPED | OUTPATIENT
Start: 2019-10-12 | End: 2019-10-17

## 2019-10-12 RX ORDER — ALBUTEROL SULFATE 90 UG/1
2 AEROSOL, METERED RESPIRATORY (INHALATION) EVERY 6 HOURS PRN
Qty: 1 INHALER | Refills: 1 | Status: SHIPPED | OUTPATIENT
Start: 2019-10-12 | End: 2020-10-11

## 2019-12-23 ENCOUNTER — HOSPITAL ENCOUNTER (EMERGENCY)
Age: 64
Discharge: HOME | End: 2019-12-23
Payer: COMMERCIAL

## 2019-12-23 VITALS
SYSTOLIC BLOOD PRESSURE: 135 MMHG | BODY MASS INDEX: 33.91 KG/M2 | WEIGHT: 250 LBS | OXYGEN SATURATION: 96 % | HEART RATE: 70 BPM | TEMPERATURE: 98.1 F | DIASTOLIC BLOOD PRESSURE: 84 MMHG | RESPIRATION RATE: 20 BRPM

## 2019-12-23 DIAGNOSIS — J20.9 ACUTE BRONCHITIS, UNSPECIFIED ORGANISM: ICD-10-CM

## 2019-12-23 DIAGNOSIS — J06.9 ACUTE UPPER RESPIRATORY INFECTION: Primary | ICD-10-CM

## 2019-12-23 PROCEDURE — 99212 OFFICE O/P EST SF 10 MIN: CPT

## 2019-12-23 RX ORDER — AZITHROMYCIN 250 MG/1
TABLET, FILM COATED ORAL
Qty: 6 TABLET | Refills: 0 | Status: SHIPPED | OUTPATIENT
Start: 2019-12-23 | End: 2020-01-02

## 2019-12-23 RX ORDER — PREDNISONE 20 MG/1
40 TABLET ORAL DAILY
Qty: 10 TABLET | Refills: 0 | Status: SHIPPED | OUTPATIENT
Start: 2019-12-23 | End: 2019-12-28

## 2020-06-15 ENCOUNTER — HOSPITAL ENCOUNTER (OUTPATIENT)
Dept: INFUSION THERAPY | Age: 65
Discharge: HOME | End: 2020-06-15
Payer: COMMERCIAL

## 2020-06-15 DIAGNOSIS — D50.0 IRON DEFICIENCY ANEMIA DUE TO CHRONIC BLOOD LOSS: ICD-10-CM

## 2020-06-17 ENCOUNTER — HOSPITAL ENCOUNTER (OUTPATIENT)
Dept: INFUSION THERAPY | Age: 65
Discharge: HOME | End: 2020-06-17
Payer: COMMERCIAL

## 2020-06-17 ENCOUNTER — OFFICE VISIT (OUTPATIENT)
Dept: ONCOLOGY | Age: 65
End: 2020-06-17
Payer: COMMERCIAL

## 2020-06-17 VITALS
DIASTOLIC BLOOD PRESSURE: 91 MMHG | HEART RATE: 64 BPM | SYSTOLIC BLOOD PRESSURE: 144 MMHG | HEIGHT: 72 IN | BODY MASS INDEX: 33.78 KG/M2 | WEIGHT: 249.4 LBS | TEMPERATURE: 98.4 F

## 2020-06-17 DIAGNOSIS — D50.0 IRON DEFICIENCY ANEMIA DUE TO CHRONIC BLOOD LOSS: ICD-10-CM

## 2020-06-17 DIAGNOSIS — D64.9 ANEMIA, UNSPECIFIED TYPE: ICD-10-CM

## 2020-06-17 LAB
ALBUMIN SERPL-MCNC: 3.8 G/DL (ref 3.5–5.2)
ALP BLD-CCNC: 83 U/L (ref 40–129)
ALT SERPL-CCNC: 16 U/L (ref 0–40)
ANION GAP SERPL CALCULATED.3IONS-SCNC: 9 MMOL/L (ref 7–16)
AST SERPL-CCNC: 19 U/L (ref 0–39)
BASOPHILS ABSOLUTE: 0.03 E9/L (ref 0–0.2)
BASOPHILS RELATIVE PERCENT: 0.7 % (ref 0–2)
BILIRUB SERPL-MCNC: 0.3 MG/DL (ref 0–1.2)
BUN BLDV-MCNC: 22 MG/DL (ref 8–23)
CALCIUM SERPL-MCNC: 8.6 MG/DL (ref 8.6–10.2)
CHLORIDE BLD-SCNC: 108 MMOL/L (ref 98–107)
CO2: 24 MMOL/L (ref 22–29)
CREAT SERPL-MCNC: 1 MG/DL (ref 0.7–1.2)
EOSINOPHILS ABSOLUTE: 0.15 E9/L (ref 0.05–0.5)
EOSINOPHILS RELATIVE PERCENT: 3.4 % (ref 0–6)
FERRITIN: 23 NG/ML
GFR AFRICAN AMERICAN: >60
GFR NON-AFRICAN AMERICAN: >60 ML/MIN/1.73
GLUCOSE BLD-MCNC: 90 MG/DL (ref 74–99)
HCT VFR BLD CALC: 38.8 % (ref 37–54)
HEMOGLOBIN: 12.2 G/DL (ref 12.5–16.5)
IMMATURE GRANULOCYTES #: 0.01 E9/L
IMMATURE GRANULOCYTES %: 0.2 % (ref 0–5)
IRON SATURATION: 19 % (ref 20–55)
IRON: 58 MCG/DL (ref 59–158)
LYMPHOCYTES ABSOLUTE: 1.07 E9/L (ref 1.5–4)
LYMPHOCYTES RELATIVE PERCENT: 24.2 % (ref 20–42)
MCH RBC QN AUTO: 28.6 PG (ref 26–35)
MCHC RBC AUTO-ENTMCNC: 31.4 % (ref 32–34.5)
MCV RBC AUTO: 90.9 FL (ref 80–99.9)
MONOCYTES ABSOLUTE: 0.54 E9/L (ref 0.1–0.95)
MONOCYTES RELATIVE PERCENT: 12.2 % (ref 2–12)
NEUTROPHILS ABSOLUTE: 2.62 E9/L (ref 1.8–7.3)
NEUTROPHILS RELATIVE PERCENT: 59.3 % (ref 43–80)
PDW BLD-RTO: 13.7 FL (ref 11.5–15)
PLATELET # BLD: 236 E9/L (ref 130–450)
PMV BLD AUTO: 9.1 FL (ref 7–12)
POTASSIUM SERPL-SCNC: 4.7 MMOL/L (ref 3.5–5)
RBC # BLD: 4.27 E12/L (ref 3.8–5.8)
SODIUM BLD-SCNC: 141 MMOL/L (ref 132–146)
TOTAL IRON BINDING CAPACITY: 311 MCG/DL (ref 250–450)
TOTAL PROTEIN: 6.4 G/DL (ref 6.4–8.3)
WBC # BLD: 4.4 E9/L (ref 4.5–11.5)

## 2020-06-17 PROCEDURE — 99212 OFFICE O/P EST SF 10 MIN: CPT | Performed by: INTERNAL MEDICINE

## 2020-06-17 PROCEDURE — 85025 COMPLETE CBC W/AUTO DIFF WBC: CPT

## 2020-06-17 PROCEDURE — 83540 ASSAY OF IRON: CPT

## 2020-06-17 PROCEDURE — 36415 COLL VENOUS BLD VENIPUNCTURE: CPT

## 2020-06-17 PROCEDURE — 83550 IRON BINDING TEST: CPT

## 2020-06-17 PROCEDURE — 80053 COMPREHEN METABOLIC PANEL: CPT

## 2020-06-17 PROCEDURE — 82728 ASSAY OF FERRITIN: CPT

## 2020-06-17 RX ORDER — DIPHENHYDRAMINE HYDROCHLORIDE 50 MG/ML
50 INJECTION INTRAMUSCULAR; INTRAVENOUS ONCE
Status: CANCELLED | OUTPATIENT
Start: 2020-06-25

## 2020-06-17 RX ORDER — HEPARIN SODIUM (PORCINE) LOCK FLUSH IV SOLN 100 UNIT/ML 100 UNIT/ML
500 SOLUTION INTRAVENOUS PRN
Status: CANCELLED | OUTPATIENT
Start: 2020-06-25

## 2020-06-17 RX ORDER — EPINEPHRINE 1 MG/ML
0.3 INJECTION, SOLUTION, CONCENTRATE INTRAVENOUS PRN
Status: CANCELLED | OUTPATIENT
Start: 2020-06-25

## 2020-06-17 RX ORDER — SODIUM CHLORIDE 9 MG/ML
INJECTION, SOLUTION INTRAVENOUS CONTINUOUS
Status: CANCELLED | OUTPATIENT
Start: 2020-06-25

## 2020-06-17 RX ORDER — METHYLPREDNISOLONE SODIUM SUCCINATE 125 MG/2ML
125 INJECTION, POWDER, LYOPHILIZED, FOR SOLUTION INTRAMUSCULAR; INTRAVENOUS ONCE
Status: CANCELLED | OUTPATIENT
Start: 2020-06-25

## 2020-06-17 RX ORDER — SODIUM CHLORIDE 0.9 % (FLUSH) 0.9 %
10 SYRINGE (ML) INJECTION PRN
Status: CANCELLED | OUTPATIENT
Start: 2020-06-25

## 2020-06-18 ENCOUNTER — TELEPHONE (OUTPATIENT)
Dept: ONCOLOGY | Age: 65
End: 2020-06-18

## 2020-06-19 LAB
CONTROL: NORMAL
HEMOCCULT STL QL: NORMAL

## 2020-06-25 ENCOUNTER — HOSPITAL ENCOUNTER (OUTPATIENT)
Dept: INFUSION THERAPY | Age: 65
Discharge: HOME | End: 2020-06-25
Payer: COMMERCIAL

## 2020-06-25 VITALS — DIASTOLIC BLOOD PRESSURE: 74 MMHG | HEART RATE: 60 BPM | SYSTOLIC BLOOD PRESSURE: 119 MMHG

## 2020-06-25 DIAGNOSIS — D50.0 IRON DEFICIENCY ANEMIA DUE TO CHRONIC BLOOD LOSS: Primary | ICD-10-CM

## 2020-06-25 DIAGNOSIS — D64.9 ANEMIA, UNSPECIFIED TYPE: ICD-10-CM

## 2020-06-25 DIAGNOSIS — Z86.2 H/O: IRON DEFICIENCY ANEMIA: ICD-10-CM

## 2020-06-25 PROCEDURE — 2580000003 HC RX 258: Performed by: NURSE PRACTITIONER

## 2020-06-25 PROCEDURE — 96365 THER/PROPH/DIAG IV INF INIT: CPT

## 2020-06-25 PROCEDURE — 6360000002 HC RX W HCPCS: Performed by: NURSE PRACTITIONER

## 2020-06-25 RX ORDER — SODIUM CHLORIDE 0.9 % (FLUSH) 0.9 %
10 SYRINGE (ML) INJECTION PRN
Status: CANCELLED | OUTPATIENT
Start: 2020-07-02

## 2020-06-25 RX ORDER — HEPARIN SODIUM (PORCINE) LOCK FLUSH IV SOLN 100 UNIT/ML 100 UNIT/ML
500 SOLUTION INTRAVENOUS PRN
Status: CANCELLED | OUTPATIENT
Start: 2020-07-02

## 2020-06-25 RX ORDER — SODIUM CHLORIDE 9 MG/ML
INJECTION, SOLUTION INTRAVENOUS CONTINUOUS
Status: CANCELLED | OUTPATIENT
Start: 2020-07-02

## 2020-06-25 RX ORDER — DIPHENHYDRAMINE HYDROCHLORIDE 50 MG/ML
50 INJECTION INTRAMUSCULAR; INTRAVENOUS ONCE
Status: CANCELLED | OUTPATIENT
Start: 2020-07-02

## 2020-06-25 RX ORDER — EPINEPHRINE 1 MG/ML
0.3 INJECTION, SOLUTION, CONCENTRATE INTRAVENOUS PRN
Status: CANCELLED | OUTPATIENT
Start: 2020-07-02

## 2020-06-25 RX ORDER — METHYLPREDNISOLONE SODIUM SUCCINATE 125 MG/2ML
125 INJECTION, POWDER, LYOPHILIZED, FOR SOLUTION INTRAMUSCULAR; INTRAVENOUS ONCE
Status: CANCELLED | OUTPATIENT
Start: 2020-07-02

## 2020-06-25 RX ORDER — SODIUM CHLORIDE 9 MG/ML
INJECTION, SOLUTION INTRAVENOUS CONTINUOUS
Status: DISCONTINUED | OUTPATIENT
Start: 2020-06-25 | End: 2020-06-26 | Stop reason: HOSPADM

## 2020-06-25 RX ADMIN — FERUMOXYTOL 510 MG: 510 INJECTION INTRAVENOUS at 14:07

## 2020-06-25 RX ADMIN — SODIUM CHLORIDE: 9 INJECTION, SOLUTION INTRAVENOUS at 14:05

## 2020-07-02 ENCOUNTER — HOSPITAL ENCOUNTER (OUTPATIENT)
Dept: INFUSION THERAPY | Age: 65
Discharge: HOME | End: 2020-07-02
Payer: COMMERCIAL

## 2020-07-02 VITALS
DIASTOLIC BLOOD PRESSURE: 65 MMHG | TEMPERATURE: 98.4 F | HEART RATE: 62 BPM | SYSTOLIC BLOOD PRESSURE: 105 MMHG | RESPIRATION RATE: 18 BRPM

## 2020-07-02 DIAGNOSIS — D50.0 IRON DEFICIENCY ANEMIA DUE TO CHRONIC BLOOD LOSS: Primary | ICD-10-CM

## 2020-07-02 DIAGNOSIS — D64.9 ANEMIA, UNSPECIFIED TYPE: ICD-10-CM

## 2020-07-02 DIAGNOSIS — Z86.2 H/O: IRON DEFICIENCY ANEMIA: ICD-10-CM

## 2020-07-02 PROCEDURE — 6360000002 HC RX W HCPCS: Performed by: NURSE PRACTITIONER

## 2020-07-02 PROCEDURE — 96365 THER/PROPH/DIAG IV INF INIT: CPT

## 2020-07-02 PROCEDURE — 2580000003 HC RX 258: Performed by: NURSE PRACTITIONER

## 2020-07-02 RX ORDER — SODIUM CHLORIDE 9 MG/ML
INJECTION, SOLUTION INTRAVENOUS CONTINUOUS
Status: DISCONTINUED | OUTPATIENT
Start: 2020-07-02 | End: 2020-07-03 | Stop reason: HOSPADM

## 2020-07-02 RX ORDER — METHYLPREDNISOLONE SODIUM SUCCINATE 125 MG/2ML
125 INJECTION, POWDER, LYOPHILIZED, FOR SOLUTION INTRAMUSCULAR; INTRAVENOUS ONCE
Status: CANCELLED | OUTPATIENT
Start: 2020-07-02

## 2020-07-02 RX ORDER — DIPHENHYDRAMINE HYDROCHLORIDE 50 MG/ML
50 INJECTION INTRAMUSCULAR; INTRAVENOUS ONCE
Status: CANCELLED | OUTPATIENT
Start: 2020-07-02

## 2020-07-02 RX ORDER — SODIUM CHLORIDE 9 MG/ML
INJECTION, SOLUTION INTRAVENOUS CONTINUOUS
Status: CANCELLED | OUTPATIENT
Start: 2020-07-02

## 2020-07-02 RX ORDER — EPINEPHRINE 1 MG/ML
0.3 INJECTION, SOLUTION, CONCENTRATE INTRAVENOUS PRN
Status: CANCELLED | OUTPATIENT
Start: 2020-07-02

## 2020-07-02 RX ORDER — HEPARIN SODIUM (PORCINE) LOCK FLUSH IV SOLN 100 UNIT/ML 100 UNIT/ML
500 SOLUTION INTRAVENOUS PRN
Status: CANCELLED | OUTPATIENT
Start: 2020-07-02

## 2020-07-02 RX ORDER — SODIUM CHLORIDE 0.9 % (FLUSH) 0.9 %
10 SYRINGE (ML) INJECTION PRN
Status: CANCELLED | OUTPATIENT
Start: 2020-07-02

## 2020-07-02 RX ADMIN — SODIUM CHLORIDE: 9 INJECTION, SOLUTION INTRAVENOUS at 14:08

## 2020-07-02 RX ADMIN — FERUMOXYTOL 510 MG: 510 INJECTION INTRAVENOUS at 14:08

## 2020-07-04 ENCOUNTER — HOSPITAL ENCOUNTER (OUTPATIENT)
Age: 65
Discharge: HOME | End: 2020-07-04
Payer: COMMERCIAL

## 2020-07-04 LAB
BASOPHILS ABSOLUTE: 0.02 E9/L (ref 0–0.2)
BASOPHILS RELATIVE PERCENT: 0.4 % (ref 0–2)
EOSINOPHILS ABSOLUTE: 0.23 E9/L (ref 0.05–0.5)
EOSINOPHILS RELATIVE PERCENT: 4.6 % (ref 0–6)
HCT VFR BLD CALC: 32.6 % (ref 37–54)
HEMOGLOBIN: 10.7 G/DL (ref 12.5–16.5)
IMMATURE GRANULOCYTES #: 0.07 E9/L
IMMATURE GRANULOCYTES %: 1.4 % (ref 0–5)
LYMPHOCYTES ABSOLUTE: 1 E9/L (ref 1.5–4)
LYMPHOCYTES RELATIVE PERCENT: 19.8 % (ref 20–42)
MCH RBC QN AUTO: 29.6 PG (ref 26–35)
MCHC RBC AUTO-ENTMCNC: 32.8 % (ref 32–34.5)
MCV RBC AUTO: 90.1 FL (ref 80–99.9)
MONOCYTES ABSOLUTE: 0.58 E9/L (ref 0.1–0.95)
MONOCYTES RELATIVE PERCENT: 11.5 % (ref 2–12)
NEUTROPHILS ABSOLUTE: 3.15 E9/L (ref 1.8–7.3)
NEUTROPHILS RELATIVE PERCENT: 62.3 % (ref 43–80)
PDW BLD-RTO: 14.1 FL (ref 11.5–15)
PLATELET # BLD: 233 E9/L (ref 130–450)
PMV BLD AUTO: 9.1 FL (ref 7–12)
RBC # BLD: 3.62 E12/L (ref 3.8–5.8)
WBC # BLD: 5.1 E9/L (ref 4.5–11.5)

## 2020-07-04 PROCEDURE — 36415 COLL VENOUS BLD VENIPUNCTURE: CPT

## 2020-07-04 PROCEDURE — 85025 COMPLETE CBC W/AUTO DIFF WBC: CPT

## 2020-07-28 NOTE — PROGRESS NOTES
[address] [facility] [address] Cristian Bhakta        Pt Name: Maria Fernanda Coyle: 1955  Date of evaluation: 7/29/2020  Primary Care Physician: Bong Marshall DO  Reason for evaluation:   Chief Complaint   Patient presents with    Anemia     Iron deficiency anemia    Follow-up          Subjective: Here for follow up.   Feels better today. Received Feraheme on 6/25/2020 and 7/2/2020. Denies melena. Reports occasional heartburn. OBJECTIVE:  VITALS:  weight is 247 lb 11.2 oz (112.4 kg). His temporal temperature is 98.2 °F (36.8 °C). His blood pressure is 126/80 and his pulse is 76. His oxygen saturation is 96%. Physical Exam:  Performance Status: 0  Well developed, well nourished male  EYES: sclera non-icteric   ENT: oropharynx clear. NECK: No lymphadenopathy. HEART: Regular rate and rhythm. LUNGS: Clear   ABDOMEN: Soft, nontender. No ascites. No mass or organomegaly. EXTREMITIES: without edema    NEUROLOGIC: No focal deficits. SKIN: No Rash.          Medications  Prior to Admission medications    Medication Sig Start Date End Date Taking?  Authorizing Provider   Phenylephrine-DM-GG (MUCINEX FAST-MAX CONGEST COUGH) 5- MG TABS Take as directed on the package for cough or congestion 12/23/19   DEBORA Red CNP   albuterol sulfate HFA (PROVENTIL HFA) 108 (90 Base) MCG/ACT inhaler Inhale 2 puffs into the lungs every 6 hours as needed for Wheezing 10/12/19 10/11/20  DEBORA Red CNP   aspirin 81 MG tablet Take 81 mg by mouth daily    Historical Provider, MD   Nutritional Supplements (ENSURE ACTIVE PO) Take by mouth    Historical Provider, MD   naproxen (NAPROSYN) 500 MG tablet Take 1 tablet by mouth 2 times daily for 7 days 5/21/19 5/28/19  DEBORA Mauricio CNP   atorvastatin (LIPITOR) 40 MG tablet Take 1 tablet by mouth daily 11/22/18   Paresh Jones MD   Cholecalciferol (VITAMIN D3) 93143 units CAPS Take by mouth    Historical Provider, MD   lisinopril (PRINIVIL;ZESTRIL) 5 MG tablet Take 5 mg by mouth daily    Historical Provider, MD   ranitidine (ZANTAC) 300 MG tablet TAKE ONE TABLET BY MOUTH TWO TIMES A DAY 11/14/17   Caryl Cruz MD   clopidogrel (PLAVIX) 75 MG tablet TAKE ONE TABLET BY MOUTH EVERY DAY 10/12/17   Caryl Cruz MD   pantoprazole (PROTONIX) 40 MG tablet Take 1 tablet by mouth daily 4/25/17   Caryl Cruz MD   Omega-3 Fatty Acids (OMEGA-3 FISH OIL PO) Take by mouth daily     Historical Provider, MD   Nutritional Supplements (ENSURE COMPLETE SHAKE PO) Take 1 Can by mouth 2 times daily. 2/11/15 12/23/19  Historical Provider, MD   docusate sodium (COLACE) 100 MG capsule Take 100 mg by mouth 2 times daily as needed     Historical Provider, MD   colchicine 0.6 MG tablet Take 0.6 mg by mouth 2 times daily     Historical Provider, MD   Coenzyme Q10 (CO Q 10 PO) Take 100 mg by mouth daily. Historical Provider, MD   metoprolol (TOPROL-XL) 25 MG XL tablet Take 25 mg by mouth daily. Historical Provider, MD   nitroGLYCERIN (NITROSTAT) 0.4 MG SL tablet Place 0.4 mg under the tongue as needed.       Historical Provider, MD    Scheduled Meds:  Continuous Infusions:  PRN Meds:.        Recent Laboratory Data-     Lab Results   Component Value Date    WBC 6.1 07/29/2020    HGB 13.2 07/29/2020    HCT 39.9 07/29/2020    MCV 88.3 07/29/2020     07/29/2020    LYMPHOPCT 15.4 (L) 07/29/2020    RBC 4.52 07/29/2020    MCH 29.2 07/29/2020    MCHC 33.1 07/29/2020    RDW 14.3 07/29/2020    NEUTOPHILPCT 72.1 07/29/2020    MONOPCT 10.2 07/29/2020    EOSPCT 6 10/15/2010    BASOPCT 0.5 07/29/2020    NEUTROABS 4.39 07/29/2020    LYMPHSABS 0.94 (L) 07/29/2020    MONOSABS 0.62 07/29/2020    EOSABS 0.08 07/29/2020    BASOSABS 0.03 07/29/2020       Lab Results   Component Value Date     06/17/2020    K 4.7 06/17/2020     (H) 06/17/2020    CO2 24 06/17/2020    BUN 22 06/17/2020    CREATININE 1.0 06/17/2020    GLUCOSE 90 06/17/2020    CALCIUM 8.6 06/17/2020    PROT 6.4 06/17/2020    LABALBU 3.8 06/17/2020    BILITOT 0.3 06/17/2020    ALKPHOS 83 06/17/2020    AST 19 06/17/2020    ALT 16 06/17/2020    LABGLOM >60 06/17/2020    GFRAA >60 06/17/2020         Lab Results   Component Value Date    IRON 58 (L) 06/17/2020    TIBC 311 06/17/2020    FERRITIN 23 06/17/2020           Radiology-  CT ABDOMEN AND PELVIS:  8/30/2019  Negative        ASSESSMENT/PLAN :  A 76-year-old man with prior history of iron deficiency anemia had been previously followed by Dr. Lauren Licea  His follow up CBC in February 2015 showed a sudden acute drop in his hemoglobin to 9.1 . He has been on oral iron supplements 325 mg by mouth 3 times a day and has been intolerant with dyspepsia constipation and dark stools . Apparently in November he had a colonoscopy by Dr. Alonzo Rae and benign polyps were removed and there was no active bleeding he also had a capsule enteroscopy and per patient it was negative. His last EGD in 2014 had shown healing of his gastric ulcer.      Back in JAN 2015 he had bronchitis and flu like symptoms and took steroids and antibiotics. He responded to IV Iron and his Hb went up to normal range. His hemoglobin remains stable and in the normal range  He did have few months ago a repeat EGD by Dr. Jose Pritchett and results will be retrieved. He was recommended to continue on ranitidine and pantoprazole  It was explained to him that his oral iron absorption will be impaired due to the fact that he is on PPI with lack of stomach acidity. Should his hemoglobin dropped again below 11 he will benefit from parenteral IV iron   His last dose of IV iron was in February 2015  Hgb 13.6 on 9/12/16. [facility] has been reassured. No treatment warranted      Hgb was 12.2  on 9/13/17.   He has been reassured.     He will be recommended oral iron supplements along with vitamin C to improve bioavailability and if his hemoglobin drops again he will be resumed on parenteral IV iron       Hgb was 13.5 on 12/13/17. No treatment warranted     Hgb was 13.9  on 6/19/19. No treatment warranted    Hgb was 12.2 on 6/17/2020. His most recent iron studies were suggestive of iron deficiency. He received  parenteral IV iron with Feraheme on 6/25/2020 and 7/2/2020. Hgb is  13.2  today----7/29/2020. He has been reassured. Sabrina Benavides M.D., F.A.C.P.   Electronically signed 7/29/2020 at 1:33 PM

## 2020-07-29 ENCOUNTER — HOSPITAL ENCOUNTER (OUTPATIENT)
Dept: INFUSION THERAPY | Age: 65
Discharge: HOME | End: 2020-07-29
Payer: COMMERCIAL

## 2020-07-29 ENCOUNTER — OFFICE VISIT (OUTPATIENT)
Dept: ONCOLOGY | Age: 65
End: 2020-07-29
Payer: COMMERCIAL

## 2020-07-29 VITALS
SYSTOLIC BLOOD PRESSURE: 126 MMHG | HEART RATE: 76 BPM | TEMPERATURE: 98.2 F | DIASTOLIC BLOOD PRESSURE: 80 MMHG | OXYGEN SATURATION: 96 % | WEIGHT: 247.7 LBS | BODY MASS INDEX: 33.59 KG/M2

## 2020-07-29 DIAGNOSIS — D50.0 IRON DEFICIENCY ANEMIA DUE TO CHRONIC BLOOD LOSS: ICD-10-CM

## 2020-07-29 LAB
BASOPHILS ABSOLUTE: 0.03 E9/L (ref 0–0.2)
BASOPHILS RELATIVE PERCENT: 0.5 % (ref 0–2)
EOSINOPHILS ABSOLUTE: 0.08 E9/L (ref 0.05–0.5)
EOSINOPHILS RELATIVE PERCENT: 1.3 % (ref 0–6)
FERRITIN: 71 NG/ML
HCT VFR BLD CALC: 39.9 % (ref 37–54)
HEMOGLOBIN: 13.2 G/DL (ref 12.5–16.5)
IMMATURE GRANULOCYTES #: 0.03 E9/L
IMMATURE GRANULOCYTES %: 0.5 % (ref 0–5)
IRON SATURATION: 16 % (ref 20–55)
IRON: 46 MCG/DL (ref 59–158)
LYMPHOCYTES ABSOLUTE: 0.94 E9/L (ref 1.5–4)
LYMPHOCYTES RELATIVE PERCENT: 15.4 % (ref 20–42)
MCH RBC QN AUTO: 29.2 PG (ref 26–35)
MCHC RBC AUTO-ENTMCNC: 33.1 % (ref 32–34.5)
MCV RBC AUTO: 88.3 FL (ref 80–99.9)
MONOCYTES ABSOLUTE: 0.62 E9/L (ref 0.1–0.95)
MONOCYTES RELATIVE PERCENT: 10.2 % (ref 2–12)
NEUTROPHILS ABSOLUTE: 4.39 E9/L (ref 1.8–7.3)
NEUTROPHILS RELATIVE PERCENT: 72.1 % (ref 43–80)
PDW BLD-RTO: 14.3 FL (ref 11.5–15)
PLATELET # BLD: 272 E9/L (ref 130–450)
PMV BLD AUTO: 9.1 FL (ref 7–12)
RBC # BLD: 4.52 E12/L (ref 3.8–5.8)
TOTAL IRON BINDING CAPACITY: 280 MCG/DL (ref 250–450)
WBC # BLD: 6.1 E9/L (ref 4.5–11.5)

## 2020-07-29 PROCEDURE — 83550 IRON BINDING TEST: CPT

## 2020-07-29 PROCEDURE — 83540 ASSAY OF IRON: CPT

## 2020-07-29 PROCEDURE — 36415 COLL VENOUS BLD VENIPUNCTURE: CPT

## 2020-07-29 PROCEDURE — 99212 OFFICE O/P EST SF 10 MIN: CPT

## 2020-07-29 PROCEDURE — 82728 ASSAY OF FERRITIN: CPT

## 2020-07-29 PROCEDURE — 85025 COMPLETE CBC W/AUTO DIFF WBC: CPT

## 2020-10-20 NOTE — PROGRESS NOTES
[address] [facility]. Cristian Garcia        Pt Name: Elizabeth Lockhart: 1955  Date of evaluation: 10/21/2020  Primary Care Physician: Rl Perla DO  Reason for evaluation:   Chief Complaint   Patient presents with    Anemia     Iron deficiency anemia    Follow-up          Subjective: Here for follow up. Patient called in yesterday, stating he felt very tired and he had bleeding from hemorrhoids and had them ligated previously by GI.    C/o of joint pain,    Has been constipated     OBJECTIVE:  VITALS:  height is 6' (1.829 m) and weight is 249 lb (112.9 kg). His temporal temperature is 97.6 °F (36.4 °C). His blood pressure is 147/85 (abnormal) and his pulse is 67. His oxygen saturation is 100%. Physical Exam:  Performance Status: 0  Well developed, well nourished male  EYES: sclera non-icteric   ENT: oropharynx clear. NECK: No lymphadenopathy. HEART: Regular rate and rhythm. LUNGS: Clear   ABDOMEN: Soft, nontender. No ascites. No mass or organomegaly. EXTREMITIES: without edema    NEUROLOGIC: No focal deficits. SKIN: No Rash.          Medications  Prior to Admission medications    Medication Sig Start Date End Date Taking?  Authorizing Provider   Phenylephrine-DM-GG ([place] [address] FAST-MAX CONGEST COUGH) 5- MG TABS Take as directed on the package for cough or congestion 12/23/19  Yes DEBORA Valentin - CNP   aspirin 81 MG tablet Take 81 mg by mouth daily   Yes Historical Provider, MD   Nutritional Supplements (ENSURE ACTIVE PO) Take by mouth   Yes Historical Provider, MD   atorvastatin (LIPITOR) 40 MG tablet Take 1 tablet by mouth daily 11/22/18  Yes Chey Carrizales MD   Cholecalciferol (VITAMIN D3) 64174 units CAPS Take by mouth   Yes Historical Provider, MD   lisinopril (PRINIVIL;ZESTRIL) 5 MG tablet Take 5 mg by mouth daily   Yes Historical Provider, MD   ranitidine (ZANTAC) 300 MG tablet TAKE ONE TABLET BY MOUTH TWO TIMES A DAY 11/14/17  Yes Brittani Davalos MD   clopidogrel (PLAVIX) 75 MG tablet TAKE ONE TABLET BY MOUTH EVERY DAY 10/12/17  Yes Brittani Davalos MD   pantoprazole (PROTONIX) 40 MG tablet Take 1 tablet by mouth daily 4/25/17  Yes Brittani Davalos MD   Omega-3 Fatty Acids (OMEGA-3 FISH OIL PO) Take by mouth daily    Yes Historical Provider, MD   docusate sodium (COLACE) 100 MG capsule Take 100 mg by mouth 2 times daily as needed    Yes Historical Provider, MD   colchicine 0.6 MG tablet Take 0.6 mg by mouth 2 times daily    Yes Historical Provider, MD   Coenzyme Q10 (CO Q 10 PO) Take 100 mg by mouth daily. Yes Historical Provider, MD   metoprolol (TOPROL-XL) 25 MG XL tablet Take 25 mg by mouth daily. Yes Historical Provider, MD   nitroGLYCERIN (NITROSTAT) 0.4 MG SL tablet Place 0.4 mg under the tongue as needed. Yes Historical Provider, MD   albuterol sulfate HFA (PROVENTIL HFA) 108 (90 Base) MCG/ACT inhaler Inhale 2 puffs into the lungs every 6 hours as needed for Wheezing 10/12/19 10/11/20  DEBORA Elias CNP   naproxen (NAPROSYN) 500 MG tablet Take 1 tablet by mouth 2 times daily for 7 days 5/21/19 5/28/19  DEBORA Ferguson CNP   Nutritional Supplements (ENSURE COMPLETE SHAKE PO) Take 1 Can by mouth 2 times daily.  2/11/15 12/23/19  Historical Provider, MD    Scheduled Meds:  Continuous Infusions:  PRN Meds:.        Recent Laboratory Data-     Lab Results   Component Value Date    WBC 6.3 10/21/2020    HGB 12.2 (L) 10/21/2020    HCT 39.2 10/21/2020    MCV 87.5 10/21/2020     10/21/2020    LYMPHOPCT 20.9 10/21/2020    RBC 4.48 10/21/2020    MCH 27.2 10/21/2020    MCHC 31.1 (L) 10/21/2020    RDW 15.5 (H) 10/21/2020    NEUTOPHILPCT 64.6 10/21/2020    MONOPCT 11.4 10/21/2020    EOSPCT 6 10/15/2010    BASOPCT 0.5 10/21/2020    NEUTROABS 4.10 10/21/2020    LYMPHSABS 1.32 (L) 10/21/2020    MONOSABS 0.72 10/21/2020    EOSABS 0.13 10/21/2020    BASOSABS 0.03 10/21/2020       Lab Results   Component Value Date     10/21/2020    K 4.5 10/21/2020     (H) 10/21/2020    CO2 28 10/21/2020    BUN 19 10/21/2020    CREATININE 0.9 10/21/2020    GLUCOSE 90 10/21/2020    CALCIUM 9.0 10/21/2020    PROT 6.6 10/21/2020    LABALBU 3.6 10/21/2020    BILITOT 0.3 10/21/2020    ALKPHOS 93 10/21/2020    AST 15 10/21/2020    ALT 15 10/21/2020    LABGLOM >60 10/21/2020    GFRAA >60 10/21/2020         Lab Results   Component Value Date    IRON 46 (L) 07/29/2020    TIBC 280 07/29/2020    FERRITIN 71 07/29/2020           Radiology-  CT ABDOMEN AND PELVIS:  8/30/2019  Negative        ASSESSMENT/PLAN :  A 60-year-old man with prior history of iron deficiency anemia had been previously followed by Dr. Jaxson Gibson  His follow up CBC in February 2015 showed a sudden acute drop in his hemoglobin to 9.1 . He has been on oral iron supplements 325 mg by mouth 3 times a day and has been intolerant with dyspepsia constipation and dark stools . Apparently in November he had a colonoscopy by Dr. Sarah Whaley and benign polyps were removed and there was no active bleeding he also had a capsule enteroscopy and per patient it was negative. His last EGD in 2014 had shown healing of his gastric ulcer.      Back in JAN 2015 he had bronchitis and flu like symptoms and took steroids and antibiotics. He responded to IV Iron and his Hb went up to normal range. His hemoglobin remains stable and in the normal range  He did have few months ago a repeat EGD by Dr. Kiel Garcia and results will be retrieved. He was recommended to continue on ranitidine and pantoprazole  It was explained to him that his oral iron absorption will be impaired due to the fact that he is on PPI with lack of stomach acidity. Should his hemoglobin dropped again below 11 he will benefit from parenteral IV iron   His last dose of IV iron was in February 2015  Hgb 13.6 on 9/12/16. [facility] has been reassured. No treatment warranted      Hgb was 12.2  on 9/13/17.   He has been reassured.     He will be recommended

## 2020-10-21 ENCOUNTER — HOSPITAL ENCOUNTER (OUTPATIENT)
Dept: INFUSION THERAPY | Age: 65
Discharge: HOME | End: 2020-10-21
Payer: MEDICARE

## 2020-10-21 ENCOUNTER — OFFICE VISIT (OUTPATIENT)
Dept: ONCOLOGY | Age: 65
End: 2020-10-21
Payer: MEDICARE

## 2020-10-21 VITALS
OXYGEN SATURATION: 100 % | WEIGHT: 249 LBS | TEMPERATURE: 97.6 F | DIASTOLIC BLOOD PRESSURE: 85 MMHG | HEIGHT: 72 IN | HEART RATE: 67 BPM | SYSTOLIC BLOOD PRESSURE: 147 MMHG | BODY MASS INDEX: 33.72 KG/M2

## 2020-10-21 DIAGNOSIS — D50.0 IRON DEFICIENCY ANEMIA DUE TO CHRONIC BLOOD LOSS: ICD-10-CM

## 2020-10-21 LAB
ALBUMIN SERPL-MCNC: 3.6 G/DL (ref 3.5–5.2)
ALP BLD-CCNC: 93 U/L (ref 40–129)
ALT SERPL-CCNC: 15 U/L (ref 0–40)
ANION GAP SERPL CALCULATED.3IONS-SCNC: 5 MMOL/L (ref 7–16)
AST SERPL-CCNC: 15 U/L (ref 0–39)
BASOPHILS ABSOLUTE: 0.03 E9/L (ref 0–0.2)
BASOPHILS RELATIVE PERCENT: 0.5 % (ref 0–2)
BILIRUB SERPL-MCNC: 0.3 MG/DL (ref 0–1.2)
BUN BLDV-MCNC: 19 MG/DL (ref 8–23)
CALCIUM SERPL-MCNC: 9 MG/DL (ref 8.6–10.2)
CHLORIDE BLD-SCNC: 110 MMOL/L (ref 98–107)
CO2: 28 MMOL/L (ref 22–29)
CREAT SERPL-MCNC: 0.9 MG/DL (ref 0.7–1.2)
EOSINOPHILS ABSOLUTE: 0.13 E9/L (ref 0.05–0.5)
EOSINOPHILS RELATIVE PERCENT: 2.1 % (ref 0–6)
FERRITIN: 17 NG/ML
GFR AFRICAN AMERICAN: >60
GFR NON-AFRICAN AMERICAN: >60 ML/MIN/1.73
GLUCOSE BLD-MCNC: 90 MG/DL (ref 74–99)
HCT VFR BLD CALC: 39.2 % (ref 37–54)
HEMOGLOBIN: 12.2 G/DL (ref 12.5–16.5)
IMMATURE GRANULOCYTES #: 0.03 E9/L
IMMATURE GRANULOCYTES %: 0.5 % (ref 0–5)
IRON SATURATION: 20 % (ref 20–55)
IRON: 61 MCG/DL (ref 59–158)
LYMPHOCYTES ABSOLUTE: 1.32 E9/L (ref 1.5–4)
LYMPHOCYTES RELATIVE PERCENT: 20.9 % (ref 20–42)
MCH RBC QN AUTO: 27.2 PG (ref 26–35)
MCHC RBC AUTO-ENTMCNC: 31.1 % (ref 32–34.5)
MCV RBC AUTO: 87.5 FL (ref 80–99.9)
MONOCYTES ABSOLUTE: 0.72 E9/L (ref 0.1–0.95)
MONOCYTES RELATIVE PERCENT: 11.4 % (ref 2–12)
NEUTROPHILS ABSOLUTE: 4.1 E9/L (ref 1.8–7.3)
NEUTROPHILS RELATIVE PERCENT: 64.6 % (ref 43–80)
PDW BLD-RTO: 15.5 FL (ref 11.5–15)
PLATELET # BLD: 228 E9/L (ref 130–450)
PMV BLD AUTO: 8.8 FL (ref 7–12)
POTASSIUM SERPL-SCNC: 4.5 MMOL/L (ref 3.5–5)
RBC # BLD: 4.48 E12/L (ref 3.8–5.8)
SODIUM BLD-SCNC: 143 MMOL/L (ref 132–146)
TOTAL IRON BINDING CAPACITY: 303 MCG/DL (ref 250–450)
TOTAL PROTEIN: 6.6 G/DL (ref 6.4–8.3)
WBC # BLD: 6.3 E9/L (ref 4.5–11.5)

## 2020-10-21 PROCEDURE — 36415 COLL VENOUS BLD VENIPUNCTURE: CPT

## 2020-10-21 PROCEDURE — 83550 IRON BINDING TEST: CPT

## 2020-10-21 PROCEDURE — 83540 ASSAY OF IRON: CPT

## 2020-10-21 PROCEDURE — 85025 COMPLETE CBC W/AUTO DIFF WBC: CPT

## 2020-10-21 PROCEDURE — 80053 COMPREHEN METABOLIC PANEL: CPT

## 2020-10-21 PROCEDURE — 99212 OFFICE O/P EST SF 10 MIN: CPT | Performed by: INTERNAL MEDICINE

## 2020-10-21 PROCEDURE — 82728 ASSAY OF FERRITIN: CPT

## 2020-10-21 RX ORDER — SODIUM CHLORIDE 9 MG/ML
INJECTION, SOLUTION INTRAVENOUS CONTINUOUS
Status: CANCELLED | OUTPATIENT
Start: 2020-10-22

## 2020-10-21 RX ORDER — SODIUM CHLORIDE 0.9 % (FLUSH) 0.9 %
5 SYRINGE (ML) INJECTION PRN
Status: CANCELLED | OUTPATIENT
Start: 2020-10-22

## 2020-10-21 RX ORDER — EPINEPHRINE 1 MG/ML
0.3 INJECTION, SOLUTION, CONCENTRATE INTRAVENOUS PRN
Status: CANCELLED | OUTPATIENT
Start: 2020-10-22

## 2020-10-21 RX ORDER — HEPARIN SODIUM (PORCINE) LOCK FLUSH IV SOLN 100 UNIT/ML 100 UNIT/ML
500 SOLUTION INTRAVENOUS PRN
Status: CANCELLED | OUTPATIENT
Start: 2020-10-22

## 2020-10-21 RX ORDER — METHYLPREDNISOLONE SODIUM SUCCINATE 125 MG/2ML
125 INJECTION, POWDER, LYOPHILIZED, FOR SOLUTION INTRAMUSCULAR; INTRAVENOUS ONCE
Status: CANCELLED | OUTPATIENT
Start: 2020-10-22

## 2020-10-21 RX ORDER — DIPHENHYDRAMINE HYDROCHLORIDE 50 MG/ML
50 INJECTION INTRAMUSCULAR; INTRAVENOUS ONCE
Status: CANCELLED | OUTPATIENT
Start: 2020-10-22

## 2020-10-21 RX ORDER — SODIUM CHLORIDE 0.9 % (FLUSH) 0.9 %
10 SYRINGE (ML) INJECTION PRN
Status: CANCELLED | OUTPATIENT
Start: 2020-10-22

## 2020-10-22 ENCOUNTER — HOSPITAL ENCOUNTER (OUTPATIENT)
Dept: INFUSION THERAPY | Age: 65
Discharge: HOME | End: 2020-10-22
Payer: MEDICARE

## 2020-10-22 VITALS
DIASTOLIC BLOOD PRESSURE: 91 MMHG | SYSTOLIC BLOOD PRESSURE: 144 MMHG | HEART RATE: 58 BPM | RESPIRATION RATE: 18 BRPM | TEMPERATURE: 98.1 F

## 2020-10-22 DIAGNOSIS — D50.0 IRON DEFICIENCY ANEMIA DUE TO CHRONIC BLOOD LOSS: Primary | ICD-10-CM

## 2020-10-22 DIAGNOSIS — Z86.2 H/O: IRON DEFICIENCY ANEMIA: ICD-10-CM

## 2020-10-22 DIAGNOSIS — D64.9 ANEMIA, UNSPECIFIED TYPE: ICD-10-CM

## 2020-10-22 PROCEDURE — 96365 THER/PROPH/DIAG IV INF INIT: CPT

## 2020-10-22 PROCEDURE — 6360000002 HC RX W HCPCS: Performed by: INTERNAL MEDICINE

## 2020-10-22 PROCEDURE — 2580000003 HC RX 258: Performed by: INTERNAL MEDICINE

## 2020-10-22 RX ORDER — HEPARIN SODIUM (PORCINE) LOCK FLUSH IV SOLN 100 UNIT/ML 100 UNIT/ML
500 SOLUTION INTRAVENOUS PRN
Status: CANCELLED | OUTPATIENT
Start: 2020-10-29

## 2020-10-22 RX ORDER — SODIUM CHLORIDE 9 MG/ML
INJECTION, SOLUTION INTRAVENOUS CONTINUOUS
Status: CANCELLED | OUTPATIENT
Start: 2020-10-29

## 2020-10-22 RX ORDER — SODIUM CHLORIDE 0.9 % (FLUSH) 0.9 %
10 SYRINGE (ML) INJECTION PRN
Status: CANCELLED | OUTPATIENT
Start: 2020-10-29

## 2020-10-22 RX ORDER — SODIUM CHLORIDE 0.9 % (FLUSH) 0.9 %
5 SYRINGE (ML) INJECTION PRN
Status: CANCELLED | OUTPATIENT
Start: 2020-10-29

## 2020-10-22 RX ORDER — DIPHENHYDRAMINE HYDROCHLORIDE 50 MG/ML
50 INJECTION INTRAMUSCULAR; INTRAVENOUS ONCE
Status: CANCELLED | OUTPATIENT
Start: 2020-10-29

## 2020-10-22 RX ORDER — SODIUM CHLORIDE 9 MG/ML
INJECTION, SOLUTION INTRAVENOUS CONTINUOUS
Status: ACTIVE | OUTPATIENT
Start: 2020-10-22 | End: 2020-10-22

## 2020-10-22 RX ORDER — METHYLPREDNISOLONE SODIUM SUCCINATE 125 MG/2ML
125 INJECTION, POWDER, LYOPHILIZED, FOR SOLUTION INTRAMUSCULAR; INTRAVENOUS ONCE
Status: CANCELLED | OUTPATIENT
Start: 2020-10-29

## 2020-10-22 RX ORDER — EPINEPHRINE 1 MG/ML
0.3 INJECTION, SOLUTION, CONCENTRATE INTRAVENOUS PRN
Status: CANCELLED | OUTPATIENT
Start: 2020-10-29

## 2020-10-22 RX ADMIN — SODIUM CHLORIDE: 9 INJECTION, SOLUTION INTRAVENOUS at 10:20

## 2020-10-22 RX ADMIN — FERUMOXYTOL 510 MG: 510 INJECTION INTRAVENOUS at 10:26

## 2020-10-29 ENCOUNTER — HOSPITAL ENCOUNTER (OUTPATIENT)
Dept: INFUSION THERAPY | Age: 65
Discharge: HOME | End: 2020-10-29
Payer: MEDICARE

## 2020-10-29 VITALS — DIASTOLIC BLOOD PRESSURE: 70 MMHG | RESPIRATION RATE: 12 BRPM | SYSTOLIC BLOOD PRESSURE: 119 MMHG | HEART RATE: 62 BPM

## 2020-10-29 DIAGNOSIS — Z86.2 H/O: IRON DEFICIENCY ANEMIA: ICD-10-CM

## 2020-10-29 DIAGNOSIS — D50.0 IRON DEFICIENCY ANEMIA DUE TO CHRONIC BLOOD LOSS: Primary | ICD-10-CM

## 2020-10-29 DIAGNOSIS — D64.9 ANEMIA, UNSPECIFIED TYPE: ICD-10-CM

## 2020-10-29 PROCEDURE — 6360000002 HC RX W HCPCS: Performed by: INTERNAL MEDICINE

## 2020-10-29 PROCEDURE — 2580000003 HC RX 258: Performed by: INTERNAL MEDICINE

## 2020-10-29 PROCEDURE — 96365 THER/PROPH/DIAG IV INF INIT: CPT

## 2020-10-29 RX ORDER — SODIUM CHLORIDE 0.9 % (FLUSH) 0.9 %
5 SYRINGE (ML) INJECTION PRN
Status: CANCELLED | OUTPATIENT
Start: 2020-11-05

## 2020-10-29 RX ORDER — METHYLPREDNISOLONE SODIUM SUCCINATE 125 MG/2ML
125 INJECTION, POWDER, LYOPHILIZED, FOR SOLUTION INTRAMUSCULAR; INTRAVENOUS ONCE
Status: CANCELLED | OUTPATIENT
Start: 2020-11-05

## 2020-10-29 RX ORDER — SODIUM CHLORIDE 9 MG/ML
INJECTION, SOLUTION INTRAVENOUS CONTINUOUS
Status: ACTIVE | OUTPATIENT
Start: 2020-10-29 | End: 2020-10-29

## 2020-10-29 RX ORDER — HEPARIN SODIUM (PORCINE) LOCK FLUSH IV SOLN 100 UNIT/ML 100 UNIT/ML
500 SOLUTION INTRAVENOUS PRN
Status: CANCELLED | OUTPATIENT
Start: 2020-11-05

## 2020-10-29 RX ORDER — SODIUM CHLORIDE 0.9 % (FLUSH) 0.9 %
10 SYRINGE (ML) INJECTION PRN
Status: CANCELLED | OUTPATIENT
Start: 2020-11-05

## 2020-10-29 RX ORDER — DIPHENHYDRAMINE HYDROCHLORIDE 50 MG/ML
50 INJECTION INTRAMUSCULAR; INTRAVENOUS ONCE
Status: CANCELLED | OUTPATIENT
Start: 2020-11-05

## 2020-10-29 RX ORDER — SODIUM CHLORIDE 9 MG/ML
INJECTION, SOLUTION INTRAVENOUS CONTINUOUS
Status: CANCELLED | OUTPATIENT
Start: 2020-11-05

## 2020-10-29 RX ORDER — EPINEPHRINE 1 MG/ML
0.3 INJECTION, SOLUTION, CONCENTRATE INTRAVENOUS PRN
Status: CANCELLED | OUTPATIENT
Start: 2020-11-05

## 2020-10-29 RX ADMIN — SODIUM CHLORIDE: 9 INJECTION, SOLUTION INTRAVENOUS at 10:20

## 2020-10-29 RX ADMIN — FERUMOXYTOL 510 MG: 510 INJECTION INTRAVENOUS at 10:29

## 2020-11-18 ENCOUNTER — HOSPITAL ENCOUNTER (OUTPATIENT)
Dept: INFUSION THERAPY | Age: 65
End: 2020-11-18
Payer: MEDICARE

## 2020-11-18 ENCOUNTER — OFFICE VISIT (OUTPATIENT)
Dept: ONCOLOGY | Age: 65
End: 2020-11-18

## 2020-11-18 NOTE — PROGRESS NOTES
[address] [facility]. [facility]        Pt Name: Ana Rosa Mayer: 1955  Date of evaluation: 11/18/2020  Primary Care Physician: Marianne Guadarrama DO  Reason for evaluation:   Chief Complaint   Patient presents with    Anemia     Iron deficiency anemia    Follow-up          Subjective: Here for follow up. C/o of joint pain,          OBJECTIVE:  VITALS:  vitals were not taken for this visit. Physical Exam:  Performance Status: 0  Well developed, well nourished male  EYES: sclera non-icteric   ENT: oropharynx clear. NECK: No lymphadenopathy. HEART: Regular rate and rhythm. LUNGS: Clear. ABDOMEN: Soft, nontender. No ascites. No mass or organomegaly. EXTREMITIES: without edema.    NEUROLOGIC: No focal deficits. SKIN: No Rash.          Medications  Prior to Admission medications    Medication Sig Start Date End Date Taking?  Authorizing Provider   Phenylephrine-DM-GG (MUCINEX FAST-MAX CONGEST COUGH) 5- MG TABS Take as directed on the package for cough or congestion 12/23/19   DEBORA Elmore - CNP   albuterol sulfate HFA (PROVENTIL HFA) 108 (90 Base) MCG/ACT inhaler Inhale 2 puffs into the lungs every 6 hours as needed for Wheezing 10/12/19 10/11/20  DEBORA Elmore CNP   aspirin 81 MG tablet Take 81 mg by mouth daily    Historical Provider, MD   Nutritional Supplements (ENSURE ACTIVE PO) Take by mouth    Historical Provider, MD   naproxen (NAPROSYN) 500 MG tablet Take 1 tablet by mouth 2 times daily for 7 days 5/21/19 5/28/19  DEBORA Bird - JULIO CESAR   atorvastatin (LIPITOR) 40 MG tablet Take 1 tablet by mouth daily 11/22/18   Troy Oropeza MD   Cholecalciferol (VITAMIN D3) 29639 units CAPS Take by mouth    Historical Provider, MD   lisinopril (PRINIVIL;ZESTRIL) 5 MG tablet Take 5 mg by mouth daily    Historical Provider, MD   ranitidine (ZANTAC) 300 MG tablet TAKE ONE TABLET BY MOUTH TWO TIMES A DAY 11/14/17   Jackie Gutiérrez MD clopidogrel (PLAVIX) 75 MG tablet TAKE ONE TABLET BY MOUTH EVERY DAY 10/12/17   Karlos Miranda MD   pantoprazole (PROTONIX) 40 MG tablet Take 1 tablet by mouth daily 4/25/17   Karlos Miranda MD   Omega-3 Fatty Acids (OMEGA-3 FISH OIL PO) Take by mouth daily     Historical Provider, MD   Nutritional Supplements (ENSURE COMPLETE SHAKE PO) Take 1 Can by mouth 2 times daily. 2/11/15 12/23/19  Historical Provider, MD   docusate sodium (COLACE) 100 MG capsule Take 100 mg by mouth 2 times daily as needed     Historical Provider, MD   colchicine 0.6 MG tablet Take 0.6 mg by mouth 2 times daily     Historical Provider, MD   Coenzyme Q10 (CO Q 10 PO) Take 100 mg by mouth daily. Historical Provider, MD   metoprolol (TOPROL-XL) 25 MG XL tablet Take 25 mg by mouth daily. Historical Provider, MD   nitroGLYCERIN (NITROSTAT) 0.4 MG SL tablet Place 0.4 mg under the tongue as needed.       Historical Provider, MD    Scheduled Meds:  Continuous Infusions:  PRN Meds:.        Recent Laboratory Data-     Lab Results   Component Value Date    WBC 6.3 10/21/2020    HGB 12.2 (L) 10/21/2020    HCT 39.2 10/21/2020    MCV 87.5 10/21/2020     10/21/2020    LYMPHOPCT 20.9 10/21/2020    RBC 4.48 10/21/2020    MCH 27.2 10/21/2020    MCHC 31.1 (L) 10/21/2020    RDW 15.5 (H) 10/21/2020    NEUTOPHILPCT 64.6 10/21/2020    MONOPCT 11.4 10/21/2020    EOSPCT 6 10/15/2010    BASOPCT 0.5 10/21/2020    NEUTROABS 4.10 10/21/2020    LYMPHSABS 1.32 (L) 10/21/2020    MONOSABS 0.72 10/21/2020    EOSABS 0.13 10/21/2020    BASOSABS 0.03 10/21/2020       Lab Results   Component Value Date     10/21/2020    K 4.5 10/21/2020     (H) 10/21/2020    CO2 28 10/21/2020    BUN 19 10/21/2020    CREATININE 0.9 10/21/2020    GLUCOSE 90 10/21/2020    CALCIUM 9.0 10/21/2020    PROT 6.6 10/21/2020    LABALBU 3.6 10/21/2020    BILITOT 0.3 10/21/2020    ALKPHOS 93 10/21/2020    AST 15 10/21/2020    ALT 15 10/21/2020    LABGLOM >60 10/21/2020    GFRAA >60 10/21/2020         Lab Results   Component Value Date    IRON 61 10/21/2020    TIBC 303 10/21/2020    FERRITIN 17 10/21/2020           Radiology-  CT ABDOMEN AND PELVIS:  8/30/2019  Negative        ASSESSMENT/PLAN :  A 59-year-old man with prior history of iron deficiency anemia had been previously followed by Dr. Edmund Perez  His follow up CBC in February 2015 showed a sudden acute drop in his hemoglobin to 9.1 . He has been on oral iron supplements 325 mg by mouth 3 times a day and has been intolerant with dyspepsia constipation and dark stools . Apparently in November he had a colonoscopy by Dr. Darian Harrison and benign polyps were removed and there was no active bleeding he also had a capsule enteroscopy and per patient it was negative. His last EGD in 2014 had shown healing of his gastric ulcer.      Back in JAN 2015 he had bronchitis and flu like symptoms and took steroids and antibiotics. He responded to IV Iron and his Hb went up to normal range. His hemoglobin remains stable and in the normal range  He did have few months ago a repeat EGD by Dr. Joelle Allen and results will be retrieved. He was recommended to continue on ranitidine and pantoprazole  It was explained to him that his oral iron absorption will be impaired due to the fact that he is on PPI with lack of stomach acidity. Should his hemoglobin dropped again below 11 he will benefit from parenteral IV iron   His last dose of IV iron was in February 2015  Hgb 13.6 on 9/12/16. [facility] has been reassured. No treatment warranted      Hgb was 12.2  on 9/13/17. He has been reassured.     He will be recommended oral iron supplements along with vitamin C to improve bioavailability and if his hemoglobin drops again he will be resumed on parenteral IV iron       Hgb was 13.5 on 12/13/17. No treatment warranted     Hgb was 13.9  on 6/19/19. No treatment warranted    Hgb was 12.2 on 6/17/2020. His most recent iron studies were suggestive of iron deficiency.   He received  parenteral IV iron with Feraheme on 6/25/2020 and 7/2/2020. Hgb was  13.2 on7/29/2020. He has been reassured. 10/21/2020  Most recent CBC today shows a drop in hemoglobin by 1 g to 12.2. Iron studies pending  He is to be resumed on parenteral IV iron and will receive 2 dose of Feraheme    11/18/2020  Received Feraheme on 10/22/2020 and 10/29/2020. Hgb is  Today----11/18/2020      Tita Govea M.D., F.A.C.P.   Electronically signed 11/18/2020 at 7:24 AM

## 2020-12-04 NOTE — PROGRESS NOTES
[address] [facility]. [facility] [place]        Pt Name: Timo Barone: 1955  Date of evaluation: 12/7/2020  Primary Care Physician: Ignacio Mauro DO  Reason for evaluation:   Chief Complaint   Patient presents with    Anemia     Iron deficiency anemia due to chronic blood loss    Follow-up          Subjective: Here for follow up. He feels much better following his IV iron  C/o of joint pain,        OBJECTIVE:  VITALS:  height is 6' (1.829 m) and weight is 246 lb (111.6 kg). His temporal temperature is 97.2 °F (36.2 °C). His blood pressure is 159/95 (abnormal) and his pulse is 69. His oxygen saturation is 97%. Physical Exam:  Performance Status: 0  Well developed, well nourished male  EYES: sclera non-icteric   ENT: oropharynx clear. NECK: No lymphadenopathy. HEART: Regular rate and rhythm. LUNGS: Clear. ABDOMEN: Soft, nontender. No ascites. No mass or organomegaly. EXTREMITIES: without edema.    NEUROLOGIC: No focal deficits. SKIN: No Rash.          Medications  Prior to Admission medications    Medication Sig Start Date End Date Taking?  Authorizing Provider   Phenylephrine-DM-GG ([place] FAST-MAX CONGEST COUGH) 5- MG TABS Take as directed on the package for cough or congestion 12/23/19  Yes DEBORA London - CNP   aspirin 81 MG tablet Take 81 mg by mouth daily   Yes Historical Provider, MD   Nutritional Supplements (ENSURE ACTIVE PO) Take by mouth   Yes Historical Provider, MD   atorvastatin (LIPITOR) 40 MG tablet Take 1 tablet by mouth daily 11/22/18  Yes Chase Smiley MD   Cholecalciferol (VITAMIN D3) 57875 units CAPS Take by mouth   Yes Historical Provider, MD   lisinopril (PRINIVIL;ZESTRIL) 5 MG tablet Take 5 mg by mouth daily   Yes Historical Provider, MD   ranitidine (ZANTAC) 300 MG tablet TAKE ONE TABLET BY MOUTH TWO TIMES A DAY 11/14/17  Yes Erin Todd MD   clopidogrel (PLAVIX) 75 MG tablet TAKE ONE TABLET BY MOUTH EVERY DAY 10/12/17  Yes Jarek Alfaro MD   pantoprazole (PROTONIX) 40 MG tablet Take 1 tablet by mouth daily 4/25/17  Yes Jarek Alfaro MD   Omega-3 Fatty Acids (OMEGA-3 FISH OIL PO) Take by mouth daily    Yes Historical Provider, MD   docusate sodium (COLACE) 100 MG capsule Take 100 mg by mouth 2 times daily as needed    Yes Historical Provider, MD   colchicine 0.6 MG tablet Take 0.6 mg by mouth 2 times daily    Yes Historical Provider, MD   Coenzyme Q10 (CO Q 10 PO) Take 100 mg by mouth daily. Yes Historical Provider, MD   metoprolol (TOPROL-XL) 25 MG XL tablet Take 25 mg by mouth daily. Yes Historical Provider, MD   nitroGLYCERIN (NITROSTAT) 0.4 MG SL tablet Place 0.4 mg under the tongue as needed. Yes Historical Provider, MD   albuterol sulfate HFA (PROVENTIL HFA) 108 (90 Base) MCG/ACT inhaler Inhale 2 puffs into the lungs every 6 hours as needed for Wheezing 10/12/19 10/11/20  DEBORA Arenas CNP   naproxen (NAPROSYN) 500 MG tablet Take 1 tablet by mouth 2 times daily for 7 days 5/21/19 5/28/19  DEBORA Egan CNP   Nutritional Supplements (ENSURE COMPLETE SHAKE PO) Take 1 Can by mouth 2 times daily.  2/11/15 12/23/19  Historical Provider, MD    Scheduled Meds:  Continuous Infusions:  PRN Meds:.        Recent Laboratory Data-     Lab Results   Component Value Date    WBC 6.3 10/21/2020    HGB 12.2 (L) 10/21/2020    HCT 39.2 10/21/2020    MCV 87.5 10/21/2020     10/21/2020    LYMPHOPCT 20.9 10/21/2020    RBC 4.48 10/21/2020    MCH 27.2 10/21/2020    MCHC 31.1 (L) 10/21/2020    RDW 15.5 (H) 10/21/2020    NEUTOPHILPCT 64.6 10/21/2020    MONOPCT 11.4 10/21/2020    EOSPCT 6 10/15/2010    BASOPCT 0.5 10/21/2020    NEUTROABS 4.10 10/21/2020    LYMPHSABS 1.32 (L) 10/21/2020    MONOSABS 0.72 10/21/2020    EOSABS 0.13 10/21/2020    BASOSABS 0.03 10/21/2020       Lab Results   Component Value Date     10/21/2020    K 4.5 10/21/2020     (H) 10/21/2020    CO2 28 10/21/2020    BUN 19 10/21/2020    CREATININE 0.9 10/21/2020    GLUCOSE 90 10/21/2020    CALCIUM 9.0 10/21/2020    PROT 6.6 10/21/2020    LABALBU 3.6 10/21/2020    BILITOT 0.3 10/21/2020    ALKPHOS 93 10/21/2020    AST 15 10/21/2020    ALT 15 10/21/2020    LABGLOM >60 10/21/2020    GFRAA >60 10/21/2020         Lab Results   Component Value Date    IRON 61 10/21/2020    TIBC 303 10/21/2020    FERRITIN 17 10/21/2020           Radiology-  CT ABDOMEN AND PELVIS:  8/30/2019  Negative        ASSESSMENT/PLAN :  A 65-year-old man with prior history of iron deficiency anemia had been previously followed by Dr. Carrie Kelley  His follow up CBC in February 2015 showed a sudden acute drop in his hemoglobin to 9.1 . He has been on oral iron supplements 325 mg by mouth 3 times a day and has been intolerant with dyspepsia constipation and dark stools . Apparently in November he had a colonoscopy by Dr. Azar Montgomery and benign polyps were removed and there was no active bleeding he also had a capsule enteroscopy and per patient it was negative. His last EGD in 2014 had shown healing of his gastric ulcer.      Back in JAN 2015 he had bronchitis and flu like symptoms and took steroids and antibiotics. He responded to IV Iron and his Hb went up to normal range. His hemoglobin remains stable and in the normal range  He did have few months ago a repeat EGD by Dr. Matilda Blackburn and results will be retrieved. He was recommended to continue on ranitidine and pantoprazole  It was explained to him that his oral iron absorption will be impaired due to the fact that he is on PPI with lack of stomach acidity. Should his hemoglobin dropped again below 11 he will benefit from parenteral IV iron   His last dose of IV iron was in February 2015  Hgb 13.6 on 9/12/16. Mingo Darting has been reassured. No treatment warranted      Hgb was 12.2  on 9/13/17.   He has been reassured.     He will be recommended oral iron supplements along with vitamin C to improve bioavailability and if his hemoglobin drops again he will be resumed on parenteral IV iron       Hgb was 13.5 on 12/13/17. No treatment warranted     Hgb was 13.9  on 6/19/19. No treatment warranted    Hgb was 12.2 on 6/17/2020. His most recent iron studies were suggestive of iron deficiency. He received  parenteral IV iron with Feraheme on 6/25/2020 and 7/2/2020. Hgb was  13.2 on7/29/2020. He has been reassured. 10/21/2020  Most recent CBC today shows a drop in hemoglobin by 1 g to 12.2. Iron studies pending  He is to be resumed on parenteral IV iron and will receive 2 dose of Feraheme      11/18/2020  Cancelled Appointment. 12/07/2020  Received Feraheme on 10/22/2020 and 10/29/2020. Hemoglobin improved to the normal range at 14. He is to avoid NSAIDs and he continues on PPIs. Jasmina Silvestre M.D., F.A.C.P.   Electronically signed 12/7/2020 at 9:45 AM

## 2020-12-07 ENCOUNTER — OFFICE VISIT (OUTPATIENT)
Dept: ONCOLOGY | Age: 65
End: 2020-12-07
Payer: MEDICARE

## 2020-12-07 ENCOUNTER — HOSPITAL ENCOUNTER (OUTPATIENT)
Dept: INFUSION THERAPY | Age: 65
Discharge: HOME | End: 2020-12-07
Payer: MEDICARE

## 2020-12-07 VITALS
WEIGHT: 246 LBS | HEIGHT: 72 IN | BODY MASS INDEX: 33.32 KG/M2 | SYSTOLIC BLOOD PRESSURE: 159 MMHG | DIASTOLIC BLOOD PRESSURE: 95 MMHG | OXYGEN SATURATION: 97 % | HEART RATE: 69 BPM | TEMPERATURE: 97.2 F

## 2020-12-07 DIAGNOSIS — D50.0 IRON DEFICIENCY ANEMIA DUE TO CHRONIC BLOOD LOSS: ICD-10-CM

## 2020-12-07 LAB
ALBUMIN SERPL-MCNC: 4 G/DL (ref 3.5–5.2)
ALP BLD-CCNC: 112 U/L (ref 40–129)
ALT SERPL-CCNC: 19 U/L (ref 0–40)
ANION GAP SERPL CALCULATED.3IONS-SCNC: 8 MMOL/L (ref 7–16)
AST SERPL-CCNC: 18 U/L (ref 0–39)
BASOPHILS ABSOLUTE: 0.02 E9/L (ref 0–0.2)
BASOPHILS RELATIVE PERCENT: 0.3 % (ref 0–2)
BILIRUB SERPL-MCNC: 0.3 MG/DL (ref 0–1.2)
BUN BLDV-MCNC: 20 MG/DL (ref 8–23)
CALCIUM SERPL-MCNC: 9.2 MG/DL (ref 8.6–10.2)
CHLORIDE BLD-SCNC: 106 MMOL/L (ref 98–107)
CO2: 27 MMOL/L (ref 22–29)
CREAT SERPL-MCNC: 1.1 MG/DL (ref 0.7–1.2)
EOSINOPHILS ABSOLUTE: 0.12 E9/L (ref 0.05–0.5)
EOSINOPHILS RELATIVE PERCENT: 1.9 % (ref 0–6)
GFR AFRICAN AMERICAN: >60
GFR NON-AFRICAN AMERICAN: >60 ML/MIN/1.73
GLUCOSE BLD-MCNC: 110 MG/DL (ref 74–99)
HCT VFR BLD CALC: 42.8 % (ref 37–54)
HEMOGLOBIN: 14 G/DL (ref 12.5–16.5)
IMMATURE GRANULOCYTES #: 0.03 E9/L
IMMATURE GRANULOCYTES %: 0.5 % (ref 0–5)
LYMPHOCYTES ABSOLUTE: 1.57 E9/L (ref 1.5–4)
LYMPHOCYTES RELATIVE PERCENT: 25.4 % (ref 20–42)
MCH RBC QN AUTO: 28.6 PG (ref 26–35)
MCHC RBC AUTO-ENTMCNC: 32.7 % (ref 32–34.5)
MCV RBC AUTO: 87.3 FL (ref 80–99.9)
MONOCYTES ABSOLUTE: 0.65 E9/L (ref 0.1–0.95)
MONOCYTES RELATIVE PERCENT: 10.5 % (ref 2–12)
NEUTROPHILS ABSOLUTE: 3.8 E9/L (ref 1.8–7.3)
NEUTROPHILS RELATIVE PERCENT: 61.4 % (ref 43–80)
PDW BLD-RTO: 16.8 FL (ref 11.5–15)
PLATELET # BLD: 242 E9/L (ref 130–450)
PMV BLD AUTO: 9 FL (ref 7–12)
POTASSIUM SERPL-SCNC: 4.6 MMOL/L (ref 3.5–5)
RBC # BLD: 4.9 E12/L (ref 3.8–5.8)
SODIUM BLD-SCNC: 141 MMOL/L (ref 132–146)
TOTAL PROTEIN: 6.9 G/DL (ref 6.4–8.3)
WBC # BLD: 6.2 E9/L (ref 4.5–11.5)

## 2020-12-07 PROCEDURE — 83540 ASSAY OF IRON: CPT

## 2020-12-07 PROCEDURE — 83550 IRON BINDING TEST: CPT

## 2020-12-07 PROCEDURE — 36415 COLL VENOUS BLD VENIPUNCTURE: CPT

## 2020-12-07 PROCEDURE — 82728 ASSAY OF FERRITIN: CPT

## 2020-12-07 PROCEDURE — 80053 COMPREHEN METABOLIC PANEL: CPT

## 2020-12-07 PROCEDURE — 99212 OFFICE O/P EST SF 10 MIN: CPT

## 2020-12-07 PROCEDURE — 85025 COMPLETE CBC W/AUTO DIFF WBC: CPT

## 2020-12-08 LAB
IRON SATURATION: 32 % (ref 20–55)
IRON: 95 MCG/DL (ref 59–158)
TOTAL IRON BINDING CAPACITY: 294 MCG/DL (ref 250–450)

## 2020-12-09 LAB — FERRITIN: 134 NG/ML

## 2021-03-04 ENCOUNTER — TELEPHONE (OUTPATIENT)
Dept: INFUSION THERAPY | Age: 66
End: 2021-03-04

## 2021-03-04 ENCOUNTER — TELEPHONE (OUTPATIENT)
Dept: ONCOLOGY | Age: 66
End: 2021-03-04

## 2021-03-04 NOTE — TELEPHONE ENCOUNTER
Patient's wife called in stating something is going on with patient's insurances as they went back to mid last year and started pulling payments made to doctors back because they billed the wrong insurance first. She said she hasn't received anything from our facility but wanted us to be aware incase they come back on us as well. I advised her that I would put a note in the system. Patient's wife verbalized understanding and was appreciative of that. She has no further questions/concerns at this time.  Electronically signed by Leopoldo Ferreira on 3/4/2021 at 12:37 PM

## 2021-03-04 NOTE — TELEPHONE ENCOUNTER
Patient's wife called and stated he's a  and they didn't receive their next appointment. I told her when the appointment was Monday March 8th, she stated if he could come same day for labs. I told her to have him come at 9am for labs and he will see Dr. Irene Elizondo at 10 am for appointment. She verbalized understanding.

## 2021-03-05 ENCOUNTER — HOSPITAL ENCOUNTER (OUTPATIENT)
Dept: INFUSION THERAPY | Age: 66
End: 2021-03-05
Payer: MEDICARE

## 2021-03-08 ENCOUNTER — HOSPITAL ENCOUNTER (OUTPATIENT)
Dept: INFUSION THERAPY | Age: 66
Discharge: HOME | End: 2021-03-08
Payer: MEDICARE

## 2021-03-08 ENCOUNTER — OFFICE VISIT (OUTPATIENT)
Dept: ONCOLOGY | Age: 66
End: 2021-03-08
Payer: MEDICARE

## 2021-03-08 VITALS
WEIGHT: 244.1 LBS | SYSTOLIC BLOOD PRESSURE: 128 MMHG | BODY MASS INDEX: 33.06 KG/M2 | DIASTOLIC BLOOD PRESSURE: 80 MMHG | HEART RATE: 61 BPM | RESPIRATION RATE: 18 BRPM | TEMPERATURE: 98.2 F | OXYGEN SATURATION: 99 % | HEIGHT: 72 IN

## 2021-03-08 DIAGNOSIS — D50.0 IRON DEFICIENCY ANEMIA DUE TO CHRONIC BLOOD LOSS: ICD-10-CM

## 2021-03-08 DIAGNOSIS — D50.0 IRON DEFICIENCY ANEMIA DUE TO CHRONIC BLOOD LOSS: Primary | ICD-10-CM

## 2021-03-08 LAB
ALBUMIN SERPL-MCNC: 4 G/DL (ref 3.5–5.2)
ALP BLD-CCNC: 119 U/L (ref 40–129)
ALT SERPL-CCNC: 84 U/L (ref 0–40)
ANION GAP SERPL CALCULATED.3IONS-SCNC: 6 MMOL/L (ref 7–16)
AST SERPL-CCNC: 83 U/L (ref 0–39)
BASOPHILS ABSOLUTE: 0.03 E9/L (ref 0–0.2)
BASOPHILS RELATIVE PERCENT: 0.3 % (ref 0–2)
BILIRUB SERPL-MCNC: 0.7 MG/DL (ref 0–1.2)
BUN BLDV-MCNC: 28 MG/DL (ref 8–23)
CALCIUM SERPL-MCNC: 8.9 MG/DL (ref 8.6–10.2)
CHLORIDE BLD-SCNC: 104 MMOL/L (ref 98–107)
CO2: 27 MMOL/L (ref 22–29)
CREAT SERPL-MCNC: 0.9 MG/DL (ref 0.7–1.2)
EOSINOPHILS ABSOLUTE: 0.07 E9/L (ref 0.05–0.5)
EOSINOPHILS RELATIVE PERCENT: 0.6 % (ref 0–6)
FERRITIN: 123 NG/ML
GFR AFRICAN AMERICAN: >60
GFR NON-AFRICAN AMERICAN: >60 ML/MIN/1.73
GLUCOSE BLD-MCNC: 113 MG/DL (ref 74–99)
HCT VFR BLD CALC: 43.3 % (ref 37–54)
HEMOGLOBIN: 14 G/DL (ref 12.5–16.5)
IMMATURE GRANULOCYTES #: 0.08 E9/L
IMMATURE GRANULOCYTES %: 0.7 % (ref 0–5)
IRON SATURATION: 32 % (ref 20–55)
IRON: 103 MCG/DL (ref 59–158)
LYMPHOCYTES ABSOLUTE: 0.9 E9/L (ref 1.5–4)
LYMPHOCYTES RELATIVE PERCENT: 7.6 % (ref 20–42)
MCH RBC QN AUTO: 30 PG (ref 26–35)
MCHC RBC AUTO-ENTMCNC: 32.3 % (ref 32–34.5)
MCV RBC AUTO: 92.7 FL (ref 80–99.9)
MONOCYTES ABSOLUTE: 0.72 E9/L (ref 0.1–0.95)
MONOCYTES RELATIVE PERCENT: 6 % (ref 2–12)
NEUTROPHILS ABSOLUTE: 10.12 E9/L (ref 1.8–7.3)
NEUTROPHILS RELATIVE PERCENT: 84.8 % (ref 43–80)
PDW BLD-RTO: 13.3 FL (ref 11.5–15)
PLATELET # BLD: 243 E9/L (ref 130–450)
PMV BLD AUTO: 8.7 FL (ref 7–12)
POTASSIUM SERPL-SCNC: 4.7 MMOL/L (ref 3.5–5)
RBC # BLD: 4.67 E12/L (ref 3.8–5.8)
SODIUM BLD-SCNC: 137 MMOL/L (ref 132–146)
TOTAL IRON BINDING CAPACITY: 321 MCG/DL (ref 250–450)
TOTAL PROTEIN: 6.2 G/DL (ref 6.4–8.3)
WBC # BLD: 11.9 E9/L (ref 4.5–11.5)

## 2021-03-08 PROCEDURE — 85025 COMPLETE CBC W/AUTO DIFF WBC: CPT

## 2021-03-08 PROCEDURE — 80053 COMPREHEN METABOLIC PANEL: CPT

## 2021-03-08 PROCEDURE — 83540 ASSAY OF IRON: CPT

## 2021-03-08 PROCEDURE — 83550 IRON BINDING TEST: CPT

## 2021-03-08 PROCEDURE — 36415 COLL VENOUS BLD VENIPUNCTURE: CPT

## 2021-03-08 PROCEDURE — 82728 ASSAY OF FERRITIN: CPT

## 2021-03-08 PROCEDURE — 99212 OFFICE O/P EST SF 10 MIN: CPT

## 2021-03-08 RX ORDER — TIZANIDINE 2 MG/1
2 TABLET ORAL EVERY 6 HOURS PRN
COMMUNITY

## 2021-03-08 RX ORDER — METHYLPREDNISOLONE 4 MG/1
4 TABLET ORAL DAILY
COMMUNITY

## 2021-06-23 DIAGNOSIS — D50.0 IRON DEFICIENCY ANEMIA DUE TO CHRONIC BLOOD LOSS: Primary | ICD-10-CM

## 2021-07-13 ENCOUNTER — HOSPITAL ENCOUNTER (OUTPATIENT)
Dept: INFUSION THERAPY | Age: 66
Discharge: HOME | End: 2021-07-13
Payer: MEDICARE

## 2021-07-13 DIAGNOSIS — D50.0 IRON DEFICIENCY ANEMIA DUE TO CHRONIC BLOOD LOSS: ICD-10-CM

## 2021-07-13 LAB
ALBUMIN SERPL-MCNC: 3.8 G/DL (ref 3.5–5.2)
ALP BLD-CCNC: 79 U/L (ref 40–129)
ALT SERPL-CCNC: 20 U/L (ref 0–40)
ANION GAP SERPL CALCULATED.3IONS-SCNC: 6 MMOL/L (ref 7–16)
AST SERPL-CCNC: 18 U/L (ref 0–39)
BASOPHILS ABSOLUTE: 0.03 E9/L (ref 0–0.2)
BASOPHILS RELATIVE PERCENT: 0.6 % (ref 0–2)
BILIRUB SERPL-MCNC: 0.4 MG/DL (ref 0–1.2)
BUN BLDV-MCNC: 17 MG/DL (ref 6–23)
CALCIUM SERPL-MCNC: 9.2 MG/DL (ref 8.6–10.2)
CHLORIDE BLD-SCNC: 107 MMOL/L (ref 98–107)
CO2: 27 MMOL/L (ref 22–29)
CREAT SERPL-MCNC: 1 MG/DL (ref 0.7–1.2)
EOSINOPHILS ABSOLUTE: 0.21 E9/L (ref 0.05–0.5)
EOSINOPHILS RELATIVE PERCENT: 4.2 % (ref 0–6)
FERRITIN: 44 NG/ML
GFR AFRICAN AMERICAN: >60
GFR NON-AFRICAN AMERICAN: >60 ML/MIN/1.73
GLUCOSE BLD-MCNC: 93 MG/DL (ref 74–99)
HCT VFR BLD CALC: 42.2 % (ref 37–54)
HEMOGLOBIN: 13.9 G/DL (ref 12.5–16.5)
IMMATURE GRANULOCYTES #: 0.01 E9/L
IMMATURE GRANULOCYTES %: 0.2 % (ref 0–5)
IRON SATURATION: 18 % (ref 20–55)
IRON: 52 MCG/DL (ref 59–158)
LYMPHOCYTES ABSOLUTE: 1.43 E9/L (ref 1.5–4)
LYMPHOCYTES RELATIVE PERCENT: 28.4 % (ref 20–42)
MCH RBC QN AUTO: 30.3 PG (ref 26–35)
MCHC RBC AUTO-ENTMCNC: 32.9 % (ref 32–34.5)
MCV RBC AUTO: 91.9 FL (ref 80–99.9)
MONOCYTES ABSOLUTE: 0.54 E9/L (ref 0.1–0.95)
MONOCYTES RELATIVE PERCENT: 10.7 % (ref 2–12)
NEUTROPHILS ABSOLUTE: 2.82 E9/L (ref 1.8–7.3)
NEUTROPHILS RELATIVE PERCENT: 55.9 % (ref 43–80)
PDW BLD-RTO: 13.1 FL (ref 11.5–15)
PLATELET # BLD: 211 E9/L (ref 130–450)
PMV BLD AUTO: 9.4 FL (ref 7–12)
POTASSIUM SERPL-SCNC: 4.8 MMOL/L (ref 3.5–5)
RBC # BLD: 4.59 E12/L (ref 3.8–5.8)
SODIUM BLD-SCNC: 140 MMOL/L (ref 132–146)
TOTAL IRON BINDING CAPACITY: 293 MCG/DL (ref 250–450)
TOTAL PROTEIN: 6.3 G/DL (ref 6.4–8.3)
WBC # BLD: 5 E9/L (ref 4.5–11.5)

## 2021-07-13 PROCEDURE — 80053 COMPREHEN METABOLIC PANEL: CPT

## 2021-07-13 PROCEDURE — 83550 IRON BINDING TEST: CPT

## 2021-07-13 PROCEDURE — 36415 COLL VENOUS BLD VENIPUNCTURE: CPT

## 2021-07-13 PROCEDURE — 83540 ASSAY OF IRON: CPT

## 2021-07-13 PROCEDURE — 85025 COMPLETE CBC W/AUTO DIFF WBC: CPT

## 2021-07-13 PROCEDURE — 82728 ASSAY OF FERRITIN: CPT

## 2021-07-16 NOTE — PROGRESS NOTES
[address] [facility]. [facility] [place]        Pt Name: David Ramirez: 1955  Date of evaluation: 7/19/2021  Primary Care Physician: Naida Chou DO  Reason for evaluation:   Chief Complaint   Patient presents with    Anemia     Deficiency anemia    Follow-up          Subjective: Here for follow up. C/o of joint pain, Naprosyn was discontinued few months ago because of abnormal LFTs      OBJECTIVE:  VITALS:  height is 6' (1.829 m) and weight is 230 lb 1.6 oz (104.4 kg). His temperature is 97.7 °F (36.5 °C). His blood pressure is 121/67 and his pulse is 57. His oxygen saturation is 98%. Physical Exam:  Performance Status: 0  Well developed, well nourished male  EYES: sclera non-icteric   ENT: oropharynx clear. NECK: No lymphadenopathy. HEART: Regular rate and rhythm. LUNGS: Clear. ABDOMEN: Soft, nontender. No ascites. No mass or organomegaly. EXTREMITIES: without edema.    NEUROLOGIC: No focal deficits. SKIN: No Rash.          Medications  Prior to Admission medications    Medication Sig Start Date End Date Taking?  Authorizing Provider   methylPREDNISolone (MEDROL) 4 MG tablet Take 4 mg by mouth daily   Yes Historical Provider, MD   tiZANidine (ZANAFLEX) 2 MG tablet Take 2 mg by mouth every 6 hours as needed   Yes Historical Provider, MD   Phenylephrine-DM-GG ([place] FAST-MAX CONGEST COUGH) 5- MG TABS Take as directed on the package for cough or congestion 12/23/19  Yes DEBORA Brooks - CNP   aspirin 81 MG tablet Take 81 mg by mouth daily   Yes Historical Provider, MD   Nutritional Supplements (ENSURE ACTIVE PO) Take by mouth   Yes Historical Provider, MD   atorvastatin (LIPITOR) 40 MG tablet Take 1 tablet by mouth daily 11/22/18  Yes Seda Head MD   Cholecalciferol (VITAMIN D3) 83896 units CAPS Take by mouth   Yes Historical Provider, MD   lisinopril (PRINIVIL;ZESTRIL) 5 MG tablet Take 5 mg by mouth daily   Yes Historical Provider, MD   ranitidine (ZANTAC) 300 MG tablet TAKE ONE TABLET BY MOUTH TWO TIMES A DAY 11/14/17  Yes Randal Dunbar MD   clopidogrel (PLAVIX) 75 MG tablet TAKE ONE TABLET BY MOUTH EVERY DAY 10/12/17  Yes Randal Dunbar MD   pantoprazole (PROTONIX) 40 MG tablet Take 1 tablet by mouth daily 4/25/17  Yes Randal Dunbar MD   Omega-3 Fatty Acids (OMEGA-3 FISH OIL PO) Take by mouth daily    Yes Historical Provider, MD   docusate sodium (COLACE) 100 MG capsule Take 100 mg by mouth 2 times daily as needed    Yes Historical Provider, MD   colchicine 0.6 MG tablet Take 0.6 mg by mouth 2 times daily    Yes Historical Provider, MD   Coenzyme Q10 (CO Q 10 PO) Take 100 mg by mouth daily. Yes Historical Provider, MD   metoprolol (TOPROL-XL) 25 MG XL tablet Take 25 mg by mouth daily. Yes Historical Provider, MD   nitroGLYCERIN (NITROSTAT) 0.4 MG SL tablet Place 0.4 mg under the tongue as needed. Yes Historical Provider, MD   albuterol sulfate HFA (PROVENTIL HFA) 108 (90 Base) MCG/ACT inhaler Inhale 2 puffs into the lungs every 6 hours as needed for Wheezing 10/12/19 10/11/20  DEBORA Doherty CNP   naproxen (NAPROSYN) 500 MG tablet Take 1 tablet by mouth 2 times daily for 7 days 5/21/19 5/28/19  DEBORA Pittman CNP   Nutritional Supplements (ENSURE COMPLETE SHAKE PO) Take 1 Can by mouth 2 times daily.  2/11/15 12/23/19  Historical Provider, MD    Scheduled Meds:  Continuous Infusions:  PRN Meds:.        Recent Laboratory Data-     Lab Results   Component Value Date    WBC 5.0 07/13/2021    HGB 13.9 07/13/2021    HCT 42.2 07/13/2021    MCV 91.9 07/13/2021     07/13/2021    LYMPHOPCT 28.4 07/13/2021    RBC 4.59 07/13/2021    MCH 30.3 07/13/2021    MCHC 32.9 07/13/2021    RDW 13.1 07/13/2021    NEUTOPHILPCT 55.9 07/13/2021    MONOPCT 10.7 07/13/2021    EOSPCT 6 10/15/2010    BASOPCT 0.6 07/13/2021    NEUTROABS 2.82 07/13/2021    LYMPHSABS 1.43 (L) 07/13/2021    MONOSABS 0.54 07/13/2021    EOSABS 0.21 07/13/2021 BASOSABS 0.03 07/13/2021       Lab Results   Component Value Date     07/13/2021    K 4.8 07/13/2021     07/13/2021    CO2 27 07/13/2021    BUN 17 07/13/2021    CREATININE 1.0 07/13/2021    GLUCOSE 93 07/13/2021    CALCIUM 9.2 07/13/2021    PROT 6.3 (L) 07/13/2021    LABALBU 3.8 07/13/2021    BILITOT 0.4 07/13/2021    ALKPHOS 79 07/13/2021    AST 18 07/13/2021    ALT 20 07/13/2021    LABGLOM >60 07/13/2021    GFRAA >60 07/13/2021         Lab Results   Component Value Date    IRON 52 (L) 07/13/2021    TIBC 293 07/13/2021    FERRITIN 44 07/13/2021           Radiology-  CT ABDOMEN AND PELVIS:  8/30/2019  Negative        ASSESSMENT/PLAN :  A 50-year-old man with prior history of iron deficiency anemia had been previously followed by Dr. Tabitha Wilson  His follow up CBC in February 2015 showed a sudden acute drop in his hemoglobin to 9.1 . He has been on oral iron supplements 325 mg by mouth 3 times a day and has been intolerant with dyspepsia constipation and dark stools . Apparently in November he had a colonoscopy by Dr. Goyo Land and benign polyps were removed and there was no active bleeding he also had a capsule enteroscopy and per patient it was negative. His last EGD in 2014 had shown healing of his gastric ulcer.      Back in JAN 2015 he had bronchitis and flu like symptoms and took steroids and antibiotics. He responded to IV Iron and his Hb went up to normal range. His hemoglobin remains stable and in the normal range  He did have few months ago a repeat EGD by Dr. Randall Miller and results will be retrieved. He was recommended to continue on ranitidine and pantoprazole  It was explained to him that his oral iron absorption will be impaired due to the fact that he is on PPI with lack of stomach acidity. Should his hemoglobin dropped again below 11 he will benefit from parenteral IV iron   His last dose of IV iron was in February 2015  Hgb 13.6 on 9/12/16. Afia Yuli has been reassured.   No treatment warranted      Hgb was 12.2  on 9/13/17. He has been reassured.     He will be recommended oral iron supplements along with vitamin C to improve bioavailability and if his hemoglobin drops again he will be resumed on parenteral IV iron       Hgb was 13.5 on 12/13/17. No treatment warranted     Hgb was 13.9  on 6/19/19. No treatment warranted    Hgb was 12.2 on 6/17/2020. His most recent iron studies were suggestive of iron deficiency. He received  parenteral IV iron with Feraheme on 6/25/2020 and 7/2/2020. Hgb was  13.2 on7/29/2020. He has been reassured. 10/21/2020  Most recent CBC today shows a drop in hemoglobin by 1 g to 12.2. Iron studies pending  He is to be resumed on parenteral IV iron and will receive 2 dose of Feraheme          12/07/2020  Received Feraheme on 10/22/2020 and 10/29/2020. Hemoglobin improved to the normal range at 14. He is to avoid NSAIDs and he continues on PPIs. 3/08/2021  Hgb stable at 14. No therapy warranted   New onset rise in transaminases which could be drug induced and related to his atorvastatin and possibly Naprosyn  It will be monitored and if it remains abnormal consideration for ultrasound of the liver will be addressed  He will follow with his PCP regarding his transaminitis. 7/19/2021  Hgb was 13.9 on 7/13/2021  His LFTs have returned to normal since discontinuation of Naprosyn. He continues on atorvastatin. His CBC and iron studies were unremarkable and no therapy warranted at this time. Tita Govea M.D., F.A.C.P.   Electronically signed 7/19/2021 at 9:54 AM

## 2021-07-19 ENCOUNTER — OFFICE VISIT (OUTPATIENT)
Dept: ONCOLOGY | Age: 66
End: 2021-07-19
Payer: MEDICARE

## 2021-07-19 VITALS
BODY MASS INDEX: 31.17 KG/M2 | OXYGEN SATURATION: 98 % | HEART RATE: 57 BPM | HEIGHT: 72 IN | DIASTOLIC BLOOD PRESSURE: 67 MMHG | SYSTOLIC BLOOD PRESSURE: 121 MMHG | WEIGHT: 230.1 LBS | TEMPERATURE: 97.7 F

## 2021-07-19 DIAGNOSIS — D50.0 IRON DEFICIENCY ANEMIA DUE TO CHRONIC BLOOD LOSS: Primary | ICD-10-CM

## 2021-07-19 PROCEDURE — 99212 OFFICE O/P EST SF 10 MIN: CPT

## 2022-01-18 NOTE — PROGRESS NOTES
[address] [facility]. [facility] [place]        Pt Name: Ricco Patel: 1955  Date of evaluation: 1/19/2022  Primary Care Physician: Maria A Bolaños DO  Reason for evaluation:   Chief Complaint   Patient presents with    Anemia     Iron deficiency anemia    Follow-up          Subjective: Here for follow up. C/o of joint pain. Takes occasional Naprosyn      OBJECTIVE:  VITALS:  height is 6' (1.829 m) and weight is 255 lb 11.2 oz (116 kg). His temperature is 97.1 °F (36.2 °C). His blood pressure is 146/98 (abnormal) and his pulse is 63. His oxygen saturation is 95%. Physical Exam:  Performance Status: 0  Well developed, well nourished male  EYES: sclera non-icteric   ENT: oropharynx clear. NECK: No lymphadenopathy. HEART: Regular rate and rhythm. LUNGS: Clear. ABDOMEN: Soft, nontender. No ascites. No mass or organomegaly. EXTREMITIES: without edema.    NEUROLOGIC: No focal deficits. SKIN: No Rash.          Medications  Prior to Admission medications    Medication Sig Start Date End Date Taking?  Authorizing Provider   methylPREDNISolone (MEDROL) 4 MG tablet Take 4 mg by mouth daily   Yes Historical Provider, MD   tiZANidine (ZANAFLEX) 2 MG tablet Take 2 mg by mouth every 6 hours as needed   Yes Historical Provider, MD   Phenylephrine-DM-GG ([address] FAST-MAX CONGEST COUGH) 5- MG TABS Take as directed on the package for cough or congestion 12/23/19  Yes DEBORA Mcclain - CNP   aspirin 81 MG tablet Take 81 mg by mouth daily   Yes Historical Provider, MD   Nutritional Supplements (ENSURE ACTIVE PO) Take by mouth   Yes Historical Provider, MD   atorvastatin (LIPITOR) 40 MG tablet Take 1 tablet by mouth daily 11/22/18  Yes Elli Xie MD   Cholecalciferol (VITAMIN D3) 35069 units CAPS Take by mouth   Yes Historical Provider, MD   lisinopril (PRINIVIL;ZESTRIL) 5 MG tablet Take 5 mg by mouth daily   Yes Historical Provider, MD   clopidogrel (PLAVIX) 75 MG tablet TAKE ONE TABLET BY MOUTH EVERY DAY 10/12/17  Yes Ramona El MD   pantoprazole (PROTONIX) 40 MG tablet Take 1 tablet by mouth daily 4/25/17  Yes Ramona El MD   Omega-3 Fatty Acids (OMEGA-3 FISH OIL PO) Take by mouth daily    Yes Historical Provider, MD   docusate sodium (COLACE) 100 MG capsule Take 100 mg by mouth 2 times daily as needed    Yes Historical Provider, MD   colchicine 0.6 MG tablet Take 0.6 mg by mouth 2 times daily    Yes Historical Provider, MD   Coenzyme Q10 (CO Q 10 PO) Take 100 mg by mouth daily. Yes Historical Provider, MD   metoprolol (TOPROL-XL) 25 MG XL tablet Take 25 mg by mouth daily. Yes Historical Provider, MD   nitroGLYCERIN (NITROSTAT) 0.4 MG SL tablet Place 0.4 mg under the tongue as needed. Yes Historical Provider, MD   albuterol sulfate HFA (PROVENTIL HFA) 108 (90 Base) MCG/ACT inhaler Inhale 2 puffs into the lungs every 6 hours as needed for Wheezing 10/12/19 10/11/20  DEBORA Laird CNP   naproxen (NAPROSYN) 500 MG tablet Take 1 tablet by mouth 2 times daily for 7 days 5/21/19 5/28/19  DEBORA Garcia CNP   Nutritional Supplements (ENSURE COMPLETE SHAKE PO) Take 1 Can by mouth 2 times daily.  2/11/15 12/23/19  Historical Provider, MD    Scheduled Meds:  Continuous Infusions:  PRN Meds:.        Recent Laboratory Data-     Lab Results   Component Value Date    WBC 6.1 01/19/2022    HGB 14.0 01/19/2022    HCT 41.5 01/19/2022    MCV 89.6 01/19/2022     01/19/2022    LYMPHOPCT 16.9 (L) 01/19/2022    RBC 4.63 01/19/2022    MCH 30.2 01/19/2022    MCHC 33.7 01/19/2022    RDW 12.9 01/19/2022    NEUTOPHILPCT 71.0 01/19/2022    MONOPCT 9.1 01/19/2022    EOSPCT 6 10/15/2010    BASOPCT 0.5 01/19/2022    NEUTROABS 4.36 01/19/2022    LYMPHSABS 1.04 (L) 01/19/2022    MONOSABS 0.56 01/19/2022    EOSABS 0.12 01/19/2022    BASOSABS 0.03 01/19/2022       Lab Results   Component Value Date     01/19/2022    K 4.6 01/19/2022     01/19/2022    CO2 27 01/19/2022    BUN 16 01/19/2022    CREATININE 1.0 01/19/2022    GLUCOSE 115 (H) 01/19/2022    CALCIUM 9.1 01/19/2022    PROT 6.7 01/19/2022    LABALBU 3.8 01/19/2022    BILITOT 0.7 01/19/2022    ALKPHOS 99 01/19/2022    AST 22 01/19/2022    ALT 27 01/19/2022    LABGLOM >60 01/19/2022    GFRAA >60 01/19/2022         Lab Results   Component Value Date    IRON 52 (L) 07/13/2021    TIBC 293 07/13/2021    FERRITIN 44 07/13/2021           Radiology-  CT ABDOMEN AND PELVIS:  8/30/2019  Negative        ASSESSMENT/PLAN :  A 67-year-old man with prior history of iron deficiency anemia had been previously followed by Dr. Wang Agustin  His follow up CBC in February 2015 showed a sudden acute drop in his hemoglobin to 9.1 . He has been on oral iron supplements 325 mg by mouth 3 times a day and has been intolerant with dyspepsia constipation and dark stools . Apparently in November he had a colonoscopy by Dr. Candelario Mcarthur and benign polyps were removed and there was no active bleeding he also had a capsule enteroscopy and per patient it was negative. His last EGD in 2014 had shown healing of his gastric ulcer.      Back in JAN 2015 he had bronchitis and flu like symptoms and took steroids and antibiotics. He responded to IV Iron and his Hb went up to normal range. His hemoglobin remains stable and in the normal range  He did have few months ago a repeat EGD by Dr. Graham Garcia and results will be retrieved. He was recommended to continue on ranitidine and pantoprazole  It was explained to him that his oral iron absorption will be impaired due to the fact that he is on PPI with lack of stomach acidity. Should his hemoglobin dropped again below 11 he will benefit from parenteral IV iron   His last dose of IV iron was in February 2015  Hgb 13.6 on 9/12/16. Gene Miles has been reassured. No treatment warranted      Hgb was 12.2  on 9/13/17.   He has been reassured.     He will be recommended oral iron supplements along with vitamin C to improve bioavailability and if his hemoglobin drops again he will be resumed on parenteral IV iron       Hgb was 13.5 on 12/13/17. No treatment warranted     Hgb was 13.9  on 6/19/19. No treatment warranted    Hgb was 12.2 on 6/17/2020. His most recent iron studies were suggestive of iron deficiency. He received  parenteral IV iron with Feraheme on 6/25/2020 and 7/2/2020. Hgb was  13.2 on7/29/2020. He has been reassured. 10/21/2020  Most recent CBC today shows a drop in hemoglobin by 1 g to 12.2. Iron studies pending  He is to be resumed on parenteral IV iron and will receive 2 dose of Feraheme      12/07/2020  Received Feraheme on 10/22/2020 and 10/29/2020. Hemoglobin improved to the normal range at 14. He is to avoid NSAIDs and he continues on PPIs. 3/08/2021  Hgb stable at 14. No therapy warranted   New onset rise in transaminases which could be drug induced and related to his atorvastatin and possibly Naprosyn  It will be monitored and if it remains abnormal consideration for ultrasound of the liver will be addressed  He will follow with his PCP regarding his transaminitis. 7/19/2021  Hgb was 13.9 on 7/13/2021  His LFTs have returned to normal since discontinuation of Naprosyn. He continues on atorvastatin. His CBC and iron studies were unremarkable and no therapy warranted at this time. 1/19/2022  Hgb is 14.0 today. Rest of CBC normal with normal MCV and RDW  His CBC and CMP are unremarkable  His LFTs have returned to normal since he has cut down his NSAIDs intake  At this time he continues on atorvastatin. His last IV iron was about a year ago  No therapy warranted at this time    Rylee Massey. Willy Lew M.D., F.A.C.P.   Electronically signed 1/19/2022 at 12:00 PM

## 2022-01-19 ENCOUNTER — OFFICE VISIT (OUTPATIENT)
Dept: ONCOLOGY | Age: 67
End: 2022-01-19
Payer: MEDICARE

## 2022-01-19 ENCOUNTER — HOSPITAL ENCOUNTER (OUTPATIENT)
Dept: INFUSION THERAPY | Age: 67
Discharge: HOME | End: 2022-01-19
Payer: MEDICARE

## 2022-01-19 VITALS
OXYGEN SATURATION: 95 % | SYSTOLIC BLOOD PRESSURE: 146 MMHG | HEART RATE: 63 BPM | DIASTOLIC BLOOD PRESSURE: 98 MMHG | WEIGHT: 255.7 LBS | BODY MASS INDEX: 34.63 KG/M2 | HEIGHT: 72 IN | TEMPERATURE: 97.1 F

## 2022-01-19 DIAGNOSIS — D50.0 IRON DEFICIENCY ANEMIA DUE TO CHRONIC BLOOD LOSS: Primary | ICD-10-CM

## 2022-01-19 DIAGNOSIS — D50.0 IRON DEFICIENCY ANEMIA DUE TO CHRONIC BLOOD LOSS: ICD-10-CM

## 2022-01-19 LAB
ALBUMIN SERPL-MCNC: 3.8 G/DL (ref 3.5–5.2)
ALP BLD-CCNC: 99 U/L (ref 40–129)
ALT SERPL-CCNC: 27 U/L (ref 0–40)
ANION GAP SERPL CALCULATED.3IONS-SCNC: 10 MMOL/L (ref 7–16)
AST SERPL-CCNC: 22 U/L (ref 0–39)
BASOPHILS ABSOLUTE: 0.03 E9/L (ref 0–0.2)
BASOPHILS RELATIVE PERCENT: 0.5 % (ref 0–2)
BILIRUB SERPL-MCNC: 0.7 MG/DL (ref 0–1.2)
BUN BLDV-MCNC: 16 MG/DL (ref 6–23)
CALCIUM SERPL-MCNC: 9.1 MG/DL (ref 8.6–10.2)
CHLORIDE BLD-SCNC: 102 MMOL/L (ref 98–107)
CO2: 27 MMOL/L (ref 22–29)
CREAT SERPL-MCNC: 1 MG/DL (ref 0.7–1.2)
EOSINOPHILS ABSOLUTE: 0.12 E9/L (ref 0.05–0.5)
EOSINOPHILS RELATIVE PERCENT: 2 % (ref 0–6)
FERRITIN: 103 NG/ML
GFR AFRICAN AMERICAN: >60
GFR NON-AFRICAN AMERICAN: >60 ML/MIN/1.73
GLUCOSE BLD-MCNC: 115 MG/DL (ref 74–99)
HCT VFR BLD CALC: 41.5 % (ref 37–54)
HEMOGLOBIN: 14 G/DL (ref 12.5–16.5)
IMMATURE GRANULOCYTES #: 0.03 E9/L
IMMATURE GRANULOCYTES %: 0.5 % (ref 0–5)
IRON SATURATION: 20 % (ref 20–55)
IRON: 58 MCG/DL (ref 59–158)
LYMPHOCYTES ABSOLUTE: 1.04 E9/L (ref 1.5–4)
LYMPHOCYTES RELATIVE PERCENT: 16.9 % (ref 20–42)
MCH RBC QN AUTO: 30.2 PG (ref 26–35)
MCHC RBC AUTO-ENTMCNC: 33.7 % (ref 32–34.5)
MCV RBC AUTO: 89.6 FL (ref 80–99.9)
MONOCYTES ABSOLUTE: 0.56 E9/L (ref 0.1–0.95)
MONOCYTES RELATIVE PERCENT: 9.1 % (ref 2–12)
NEUTROPHILS ABSOLUTE: 4.36 E9/L (ref 1.8–7.3)
NEUTROPHILS RELATIVE PERCENT: 71 % (ref 43–80)
PDW BLD-RTO: 12.9 FL (ref 11.5–15)
PLATELET # BLD: 216 E9/L (ref 130–450)
PMV BLD AUTO: 9 FL (ref 7–12)
POTASSIUM SERPL-SCNC: 4.6 MMOL/L (ref 3.5–5)
RBC # BLD: 4.63 E12/L (ref 3.8–5.8)
SODIUM BLD-SCNC: 139 MMOL/L (ref 132–146)
TOTAL IRON BINDING CAPACITY: 285 MCG/DL (ref 250–450)
TOTAL PROTEIN: 6.7 G/DL (ref 6.4–8.3)
WBC # BLD: 6.1 E9/L (ref 4.5–11.5)

## 2022-01-19 PROCEDURE — 82728 ASSAY OF FERRITIN: CPT

## 2022-01-19 PROCEDURE — 80053 COMPREHEN METABOLIC PANEL: CPT

## 2022-01-19 PROCEDURE — 83540 ASSAY OF IRON: CPT

## 2022-01-19 PROCEDURE — 99212 OFFICE O/P EST SF 10 MIN: CPT

## 2022-01-19 PROCEDURE — 85025 COMPLETE CBC W/AUTO DIFF WBC: CPT

## 2022-01-19 PROCEDURE — 83550 IRON BINDING TEST: CPT

## 2022-01-19 PROCEDURE — 36415 COLL VENOUS BLD VENIPUNCTURE: CPT

## 2022-07-19 NOTE — PROGRESS NOTES
[facility]        Pt Name: Iline Ormond: 1955  Date of evaluation: 7/20/2022  Primary Care Physician: Jasmina Kim DO  Reason for evaluation:   Chief Complaint   Patient presents with    Anemia    Follow-up          Subjective: Here for follow up. C/o of joint pain. Takes occasional Naprosyn      OBJECTIVE:  VITALS:  height is 6' (1.829 m) and weight is 246 lb 4.8 oz (111.7 kg). His temperature is 98.8 °F (37.1 °C). His blood pressure is 137/88 and his pulse is 70. His oxygen saturation is 97%. Physical Exam:  Performance Status: 0  Well developed, well nourished male  EYES: sclera non-icteric   ENT: oropharynx clear. NECK: No lymphadenopathy. HEART: Regular rate and rhythm. LUNGS: Clear. ABDOMEN: Soft, nontender. No ascites. No mass or organomegaly. EXTREMITIES: without edema. NEUROLOGIC: No focal deficits. SKIN: No Rash. Medications  Prior to Admission medications    Medication Sig Start Date End Date Taking?  Authorizing Provider   methylPREDNISolone (MEDROL) 4 MG tablet Take 4 mg by mouth daily   Yes Historical Provider, MD   tiZANidine (ZANAFLEX) 2 MG tablet Take 2 mg by mouth every 6 hours as needed   Yes Historical Provider, MD   Phenylephrine-DM-GG ([place] [address] FAST-MAX CONGEST COUGH) 5- MG TABS Take as directed on the package for cough or congestion 12/23/19  Yes Luna Carbajal APRN - CNP   aspirin 81 MG tablet Take 81 mg by mouth daily   Yes Historical Provider, MD   Nutritional Supplements (ENSURE ACTIVE PO) Take by mouth   Yes Historical Provider, MD   atorvastatin (LIPITOR) 40 MG tablet Take 1 tablet by mouth daily 11/22/18  Yes Elida Amaya MD   Cholecalciferol (VITAMIN D3) 13679 units CAPS Take by mouth   Yes Historical Provider, MD   lisinopril (PRINIVIL;ZESTRIL) 5 MG tablet Take 5 mg by mouth daily   Yes Historical Provider, MD   clopidogrel (PLAVIX) 75 MG tablet TAKE ONE TABLET BY MOUTH EVERY DAY 10/12/17  Yes Favio Akbar MD   pantoprazole (PROTONIX) 40 MG tablet Take 1 tablet by mouth daily 4/25/17  Yes Favio Akbar MD   Omega-3 Fatty Acids (OMEGA-3 FISH OIL PO) Take by mouth daily    Yes Historical Provider, MD   docusate sodium (COLACE) 100 MG capsule Take 100 mg by mouth 2 times daily as needed    Yes Historical Provider, MD   colchicine 0.6 MG tablet Take 0.6 mg by mouth 2 times daily    Yes Historical Provider, MD   Coenzyme Q10 (CO Q 10 PO) Take 100 mg by mouth daily. Yes Historical Provider, MD   metoprolol (TOPROL-XL) 25 MG XL tablet Take 25 mg by mouth daily. Yes Historical Provider, MD   nitroGLYCERIN (NITROSTAT) 0.4 MG SL tablet Place 0.4 mg under the tongue as needed. Yes Historical Provider, MD   albuterol sulfate HFA (PROVENTIL HFA) 108 (90 Base) MCG/ACT inhaler Inhale 2 puffs into the lungs every 6 hours as needed for Wheezing 10/12/19 10/11/20  DEBORA Carrera CNP   naproxen (NAPROSYN) 500 MG tablet Take 1 tablet by mouth 2 times daily for 7 days 5/21/19 5/28/19  DEBORA Hand CNP   Nutritional Supplements (ENSURE COMPLETE SHAKE PO) Take 1 Can by mouth 2 times daily.  2/11/15 12/23/19  Historical Provider, MD    Scheduled Meds:  Continuous Infusions:  PRN Meds:.        Recent Laboratory Data-     Lab Results   Component Value Date    WBC 5.2 07/20/2022    HGB 14.5 07/20/2022    HCT 42.3 07/20/2022    MCV 86.9 07/20/2022     07/20/2022    LYMPHOPCT 20.3 07/20/2022    RBC 4.87 07/20/2022    MCH 29.8 07/20/2022    MCHC 34.3 07/20/2022    RDW 13.2 07/20/2022    NEUTOPHILPCT 65.1 07/20/2022    MONOPCT 10.5 07/20/2022    EOSPCT 6 10/15/2010    BASOPCT 0.6 07/20/2022    NEUTROABS 3.40 07/20/2022    LYMPHSABS 1.06 (L) 07/20/2022    MONOSABS 0.55 07/20/2022    EOSABS 0.16 07/20/2022    BASOSABS 0.03 07/20/2022       Lab Results   Component Value Date     07/20/2022    K 4.0 07/20/2022     07/20/2022    CO2 27 07/20/2022    BUN 13 07/20/2022    CREATININE 0.9 07/20/2022    GLUCOSE 119 (H) 07/20/2022    CALCIUM 9.6 07/20/2022    PROT 7.2 07/20/2022    LABALBU 4.2 07/20/2022    BILITOT 0.8 07/20/2022    ALKPHOS 81 07/20/2022    AST 28 07/20/2022    ALT 26 07/20/2022    LABGLOM >60 07/20/2022    GFRAA >60 07/20/2022         Lab Results   Component Value Date    IRON 58 (L) 01/19/2022    TIBC 285 01/19/2022    FERRITIN 103 01/19/2022           Radiology-  CT ABDOMEN AND PELVIS:  8/30/2019  Negative        ASSESSMENT/PLAN :  A 79-year-old man with prior history of iron deficiency anemia had been previously followed by Dr. Terell Flores. His follow up CBC in February 2015 showed a sudden acute drop in his hemoglobin to 9.1 . He has been on oral iron supplements 325 mg by mouth 3 times a day and has been intolerant with dyspepsia constipation and dark stools . Apparently in November he had a colonoscopy by Dr. Matthew Pizano and benign polyps were removed and there was no active bleeding he also had a capsule enteroscopy and per patient it was negative. His last EGD in 2014 had shown healing of his gastric ulcer. Back in JAN 2015 he had bronchitis and flu like symptoms and took steroids and antibiotics. He responded to IV Iron and his Hb went up to normal range. His hemoglobin remains stable and in the normal range  He did have few months ago a repeat EGD by Dr. Som Marlow and results will be retrieved. He was recommended to continue on ranitidine and pantoprazole  It was explained to him that his oral iron absorption will be impaired due to the fact that he is on PPI with lack of stomach acidity. Should his hemoglobin dropped again below 11 he will benefit from parenteral IV iron   His last dose of IV iron was in February 2015  Hgb 13.6 on 9/12/16. He has been reassured. No treatment warranted      Hgb was 12.2  on 9/13/17. He has been reassured.      He will be recommended oral iron supplements along with vitamin C to improve bioavailability and if his hemoglobin drops again he will be resumed on parenteral IV iron       Hgb was 13.5 on 12/13/17. No treatment warranted     Hgb was 13.9  on 6/19/19. No treatment warranted    Hgb was 12.2 on 6/17/2020. His most recent iron studies were suggestive of iron deficiency. He received  parenteral IV iron with Feraheme on 6/25/2020 and 7/2/2020. Hgb was  13.2 on7/29/2020. He has been reassured. 10/21/2020  Most recent CBC today shows a drop in hemoglobin by 1 g to 12.2. Iron studies pending  He is to be resumed on parenteral IV iron and will receive 2 dose of Feraheme      12/07/2020  Received Feraheme on 10/22/2020 and 10/29/2020. Hemoglobin improved to the normal range at 14. He is to avoid NSAIDs and he continues on PPIs. 3/08/2021  Hgb stable at 14. No therapy warranted   New onset rise in transaminases which could be drug induced and related to his atorvastatin and possibly Naprosyn  It will be monitored and if it remains abnormal consideration for ultrasound of the liver will be addressed  He will follow with his PCP regarding his transaminitis. 7/19/2021  Hgb was 13.9 on 7/13/2021  His LFTs have returned to normal since discontinuation of Naprosyn. He continues on atorvastatin. His CBC and iron studies were unremarkable and no therapy warranted at this time. 1/19/2022  Hgb is 14.0 today. Rest of CBC normal with normal MCV and RDW  His CBC and CMP are unremarkable  His LFTs have returned to normal since he has cut down his NSAIDs intake  At this time he continues on atorvastatin. His last IV iron was about a year ago  No therapy warranted at this time. 7/20/2022  Doing well. No complaints denies any heartburn or melena. He continues on pantoprazole but he remains on baby aspirin and Plavix and again it was stressed to him the importance of avoiding NSAIDs since he had been taking as needed Naprosyn.   His CBC and CMP today are in the normal range and he has no evidence of recurrent anemia. His examination is unremarkable. Impression :   prior history of iron deficiency anemia in relation to gastritis requiring parenteral IV iron in the past.  No therapy warranted at this time. Bruce Huitron. Marely Martin M.D., F.A.C.P.   Electronically signed 7/20/2022 at 10:51 AM

## 2022-07-20 ENCOUNTER — OFFICE VISIT (OUTPATIENT)
Dept: ONCOLOGY | Age: 67
End: 2022-07-20
Payer: MEDICARE

## 2022-07-20 ENCOUNTER — HOSPITAL ENCOUNTER (OUTPATIENT)
Dept: INFUSION THERAPY | Age: 67
Discharge: HOME | End: 2022-07-20
Payer: MEDICARE

## 2022-07-20 VITALS
OXYGEN SATURATION: 97 % | BODY MASS INDEX: 33.36 KG/M2 | TEMPERATURE: 98.8 F | HEIGHT: 72 IN | SYSTOLIC BLOOD PRESSURE: 137 MMHG | WEIGHT: 246.3 LBS | DIASTOLIC BLOOD PRESSURE: 88 MMHG | HEART RATE: 70 BPM

## 2022-07-20 DIAGNOSIS — D50.0 IRON DEFICIENCY ANEMIA DUE TO CHRONIC BLOOD LOSS: Primary | ICD-10-CM

## 2022-07-20 DIAGNOSIS — D50.0 IRON DEFICIENCY ANEMIA DUE TO CHRONIC BLOOD LOSS: ICD-10-CM

## 2022-07-20 LAB
ALBUMIN SERPL-MCNC: 4.2 G/DL (ref 3.5–5.2)
ALP BLD-CCNC: 81 U/L (ref 40–129)
ALT SERPL-CCNC: 26 U/L (ref 0–40)
ANION GAP SERPL CALCULATED.3IONS-SCNC: 10 MMOL/L (ref 7–16)
AST SERPL-CCNC: 28 U/L (ref 0–39)
BASOPHILS ABSOLUTE: 0.03 E9/L (ref 0–0.2)
BASOPHILS RELATIVE PERCENT: 0.6 % (ref 0–2)
BILIRUB SERPL-MCNC: 0.8 MG/DL (ref 0–1.2)
BUN BLDV-MCNC: 13 MG/DL (ref 6–23)
CALCIUM SERPL-MCNC: 9.6 MG/DL (ref 8.6–10.2)
CHLORIDE BLD-SCNC: 104 MMOL/L (ref 98–107)
CO2: 27 MMOL/L (ref 22–29)
CREAT SERPL-MCNC: 0.9 MG/DL (ref 0.7–1.2)
EOSINOPHILS ABSOLUTE: 0.16 E9/L (ref 0.05–0.5)
EOSINOPHILS RELATIVE PERCENT: 3.1 % (ref 0–6)
FERRITIN: 67 NG/ML
GFR AFRICAN AMERICAN: >60
GFR NON-AFRICAN AMERICAN: >60 ML/MIN/1.73
GLUCOSE BLD-MCNC: 119 MG/DL (ref 74–99)
HCT VFR BLD CALC: 42.3 % (ref 37–54)
HEMOGLOBIN: 14.5 G/DL (ref 12.5–16.5)
IMMATURE GRANULOCYTES #: 0.02 E9/L
IMMATURE GRANULOCYTES %: 0.4 % (ref 0–5)
IRON SATURATION: 41 % (ref 20–55)
IRON: 112 MCG/DL (ref 59–158)
LYMPHOCYTES ABSOLUTE: 1.06 E9/L (ref 1.5–4)
LYMPHOCYTES RELATIVE PERCENT: 20.3 % (ref 20–42)
MCH RBC QN AUTO: 29.8 PG (ref 26–35)
MCHC RBC AUTO-ENTMCNC: 34.3 % (ref 32–34.5)
MCV RBC AUTO: 86.9 FL (ref 80–99.9)
MONOCYTES ABSOLUTE: 0.55 E9/L (ref 0.1–0.95)
MONOCYTES RELATIVE PERCENT: 10.5 % (ref 2–12)
NEUTROPHILS ABSOLUTE: 3.4 E9/L (ref 1.8–7.3)
NEUTROPHILS RELATIVE PERCENT: 65.1 % (ref 43–80)
PDW BLD-RTO: 13.2 FL (ref 11.5–15)
PLATELET # BLD: 271 E9/L (ref 130–450)
PMV BLD AUTO: 9 FL (ref 7–12)
POTASSIUM SERPL-SCNC: 4 MMOL/L (ref 3.5–5)
RBC # BLD: 4.87 E12/L (ref 3.8–5.8)
SODIUM BLD-SCNC: 141 MMOL/L (ref 132–146)
TOTAL IRON BINDING CAPACITY: 275 MCG/DL (ref 250–450)
TOTAL PROTEIN: 7.2 G/DL (ref 6.4–8.3)
WBC # BLD: 5.2 E9/L (ref 4.5–11.5)

## 2022-07-20 PROCEDURE — 83550 IRON BINDING TEST: CPT

## 2022-07-20 PROCEDURE — 83540 ASSAY OF IRON: CPT

## 2022-07-20 PROCEDURE — 99212 OFFICE O/P EST SF 10 MIN: CPT

## 2022-07-20 PROCEDURE — 85025 COMPLETE CBC W/AUTO DIFF WBC: CPT

## 2022-07-20 PROCEDURE — 80053 COMPREHEN METABOLIC PANEL: CPT

## 2022-07-20 PROCEDURE — 82728 ASSAY OF FERRITIN: CPT

## 2022-07-20 PROCEDURE — 36415 COLL VENOUS BLD VENIPUNCTURE: CPT

## 2023-01-18 ENCOUNTER — HOSPITAL ENCOUNTER (OUTPATIENT)
Dept: INFUSION THERAPY | Age: 68
Discharge: HOME | End: 2023-01-18
Payer: MEDICARE

## 2023-01-18 ENCOUNTER — OFFICE VISIT (OUTPATIENT)
Dept: ONCOLOGY | Age: 68
End: 2023-01-18
Payer: MEDICARE

## 2023-01-18 VITALS
DIASTOLIC BLOOD PRESSURE: 83 MMHG | HEART RATE: 72 BPM | WEIGHT: 267.5 LBS | TEMPERATURE: 98.4 F | HEIGHT: 72 IN | BODY MASS INDEX: 36.23 KG/M2 | OXYGEN SATURATION: 95 % | SYSTOLIC BLOOD PRESSURE: 130 MMHG

## 2023-01-18 DIAGNOSIS — D50.0 IRON DEFICIENCY ANEMIA DUE TO CHRONIC BLOOD LOSS: Primary | ICD-10-CM

## 2023-01-18 DIAGNOSIS — D50.0 IRON DEFICIENCY ANEMIA DUE TO CHRONIC BLOOD LOSS: ICD-10-CM

## 2023-01-18 LAB
ALBUMIN SERPL-MCNC: 4 G/DL (ref 3.5–5.2)
ALP BLD-CCNC: 84 U/L (ref 40–129)
ALT SERPL-CCNC: 35 U/L (ref 0–40)
ANION GAP SERPL CALCULATED.3IONS-SCNC: 10 MMOL/L (ref 7–16)
AST SERPL-CCNC: 28 U/L (ref 0–39)
BASOPHILS ABSOLUTE: 0.02 E9/L (ref 0–0.2)
BASOPHILS RELATIVE PERCENT: 0.3 % (ref 0–2)
BILIRUB SERPL-MCNC: 1.2 MG/DL (ref 0–1.2)
BUN BLDV-MCNC: 12 MG/DL (ref 6–23)
CALCIUM SERPL-MCNC: 9.2 MG/DL (ref 8.6–10.2)
CHLORIDE BLD-SCNC: 101 MMOL/L (ref 98–107)
CO2: 26 MMOL/L (ref 22–29)
CREAT SERPL-MCNC: 1 MG/DL (ref 0.7–1.2)
EOSINOPHILS ABSOLUTE: 0.22 E9/L (ref 0.05–0.5)
EOSINOPHILS RELATIVE PERCENT: 3.5 % (ref 0–6)
FERRITIN: 30 NG/ML
GFR SERPL CREATININE-BSD FRML MDRD: >60 ML/MIN/1.73
GLUCOSE BLD-MCNC: 139 MG/DL (ref 74–99)
HCT VFR BLD CALC: 41.7 % (ref 37–54)
HEMOGLOBIN: 14.4 G/DL (ref 12.5–16.5)
IMMATURE GRANULOCYTES #: 0.03 E9/L
IMMATURE GRANULOCYTES %: 0.5 % (ref 0–5)
IRON SATURATION: 34 % (ref 20–55)
IRON: 115 MCG/DL (ref 59–158)
LYMPHOCYTES ABSOLUTE: 1.16 E9/L (ref 1.5–4)
LYMPHOCYTES RELATIVE PERCENT: 18.7 % (ref 20–42)
MCH RBC QN AUTO: 29.9 PG (ref 26–35)
MCHC RBC AUTO-ENTMCNC: 34.5 % (ref 32–34.5)
MCV RBC AUTO: 86.7 FL (ref 80–99.9)
MONOCYTES ABSOLUTE: 0.58 E9/L (ref 0.1–0.95)
MONOCYTES RELATIVE PERCENT: 9.4 % (ref 2–12)
NEUTROPHILS ABSOLUTE: 4.19 E9/L (ref 1.8–7.3)
NEUTROPHILS RELATIVE PERCENT: 67.6 % (ref 43–80)
PDW BLD-RTO: 13.3 FL (ref 11.5–15)
PLATELET # BLD: 262 E9/L (ref 130–450)
PMV BLD AUTO: 8.7 FL (ref 7–12)
POTASSIUM SERPL-SCNC: 3.8 MMOL/L (ref 3.5–5)
RBC # BLD: 4.81 E12/L (ref 3.8–5.8)
SODIUM BLD-SCNC: 137 MMOL/L (ref 132–146)
TOTAL IRON BINDING CAPACITY: 335 MCG/DL (ref 250–450)
TOTAL PROTEIN: 6.8 G/DL (ref 6.4–8.3)
WBC # BLD: 6.2 E9/L (ref 4.5–11.5)

## 2023-01-18 PROCEDURE — 99212 OFFICE O/P EST SF 10 MIN: CPT

## 2023-01-18 PROCEDURE — 83540 ASSAY OF IRON: CPT

## 2023-01-18 PROCEDURE — 83550 IRON BINDING TEST: CPT

## 2023-01-18 PROCEDURE — 80053 COMPREHEN METABOLIC PANEL: CPT

## 2023-01-18 PROCEDURE — 85025 COMPLETE CBC W/AUTO DIFF WBC: CPT

## 2023-01-18 PROCEDURE — 82728 ASSAY OF FERRITIN: CPT

## 2023-01-18 PROCEDURE — 36415 COLL VENOUS BLD VENIPUNCTURE: CPT

## 2023-01-18 NOTE — PROGRESS NOTES
[facility]. [facility]        Pt Name: Alan Ray: 1955  Date of evaluation: 1/18/2023  Primary Care Physician: Khalida Hodges DO  Reason for evaluation:   Chief Complaint   Patient presents with    Anemia     Iron deficiency anemia due to chronic blood loss. Follow-up    Nausea          Subjective: Here for follow up. C/o of joint pain. Takes occasional Naprosyn      OBJECTIVE:  VITALS:  vitals were not taken for this visit. Physical Exam:  Performance Status: 0  Well developed, well nourished male  EYES: sclera non-icteric   ENT: oropharynx clear. NECK: No lymphadenopathy. HEART: Regular rate and rhythm. LUNGS: Clear. ABDOMEN: Soft, nontender. No ascites. No mass or organomegaly. EXTREMITIES: without edema. NEUROLOGIC: No focal deficits. SKIN: No Rash. Medications  Prior to Admission medications    Medication Sig Start Date End Date Taking?  Authorizing Provider   methylPREDNISolone (MEDROL) 4 MG tablet Take 4 mg by mouth daily    Historical Provider, MD   tiZANidine (ZANAFLEX) 2 MG tablet Take 2 mg by mouth every 6 hours as needed    Historical Provider, MD   Phenylephrine-DM-GG ([place] FAST-MAX CONGEST COUGH) 5- MG TABS Take as directed on the package for cough or congestion 12/23/19   DEBORA Hansen CNP   albuterol sulfate HFA (PROVENTIL HFA) 108 (90 Base) MCG/ACT inhaler Inhale 2 puffs into the lungs every 6 hours as needed for Wheezing 10/12/19 10/11/20  DEBORA Hansen CNP   aspirin 81 MG tablet Take 81 mg by mouth daily    Historical Provider, MD   Nutritional Supplements (ENSURE ACTIVE PO) Take by mouth    Historical Provider, MD   naproxen (NAPROSYN) 500 MG tablet Take 1 tablet by mouth 2 times daily for 7 days 5/21/19 5/28/19  DEBORA Staton CNP   atorvastatin (LIPITOR) 40 MG tablet Take 1 tablet by mouth daily 11/22/18   Vilma Arthur MD   Cholecalciferol (VITAMIN D3) 50439 units CAPS Take by mouth    Historical Provider, MD   lisinopril (PRINIVIL;ZESTRIL) 5 MG tablet Take 5 mg by mouth daily    Historical Provider, MD   clopidogrel (PLAVIX) 75 MG tablet TAKE ONE TABLET BY MOUTH EVERY DAY 10/12/17   Winter Craft MD   pantoprazole (PROTONIX) 40 MG tablet Take 1 tablet by mouth daily 4/25/17   Winter Craft MD   Omega-3 Fatty Acids (OMEGA-3 FISH OIL PO) Take by mouth daily     Historical Provider, MD   Nutritional Supplements (ENSURE COMPLETE SHAKE PO) Take 1 Can by mouth 2 times daily. 2/11/15 12/23/19  Historical Provider, MD   docusate sodium (COLACE) 100 MG capsule Take 100 mg by mouth 2 times daily as needed     Historical Provider, MD   colchicine 0.6 MG tablet Take 0.6 mg by mouth 2 times daily     Historical Provider, MD   Coenzyme Q10 (CO Q 10 PO) Take 100 mg by mouth daily. Historical Provider, MD   metoprolol (TOPROL-XL) 25 MG XL tablet Take 25 mg by mouth daily. Historical Provider, MD   nitroGLYCERIN (NITROSTAT) 0.4 MG SL tablet Place 0.4 mg under the tongue as needed.       Historical Provider, MD    Scheduled Meds:  Continuous Infusions:  PRN Meds:.        Recent Laboratory Data-     Lab Results   Component Value Date    WBC 5.2 07/20/2022    HGB 14.5 07/20/2022    HCT 42.3 07/20/2022    MCV 86.9 07/20/2022     07/20/2022    LYMPHOPCT 20.3 07/20/2022    RBC 4.87 07/20/2022    MCH 29.8 07/20/2022    MCHC 34.3 07/20/2022    RDW 13.2 07/20/2022    NEUTOPHILPCT 65.1 07/20/2022    MONOPCT 10.5 07/20/2022    EOSPCT 6 10/15/2010    BASOPCT 0.6 07/20/2022    NEUTROABS 3.40 07/20/2022    LYMPHSABS 1.06 (L) 07/20/2022    MONOSABS 0.55 07/20/2022    EOSABS 0.16 07/20/2022    BASOSABS 0.03 07/20/2022       Lab Results   Component Value Date     07/20/2022    K 4.0 07/20/2022     07/20/2022    CO2 27 07/20/2022    BUN 13 07/20/2022    CREATININE 0.9 07/20/2022    GLUCOSE 119 (H) 07/20/2022    CALCIUM 9.6 07/20/2022    PROT 7.2 07/20/2022    LABALBU 4.2 07/20/2022    BILITOT 0.8 07/20/2022    ALKPHOS 81 07/20/2022    AST 28 07/20/2022    ALT 26 07/20/2022    LABGLOM >60 07/20/2022    GFRAA >60 07/20/2022         Lab Results   Component Value Date    IRON 112 07/20/2022    TIBC 275 07/20/2022    FERRITIN 67 07/20/2022           Radiology-  CT ABDOMEN AND PELVIS:  8/30/2019  Negative        ASSESSMENT/PLAN :  A 79-year-old man with prior history of iron deficiency anemia had been previously followed by Dr. Ailyn Slater. His follow up CBC in February 2015 showed a sudden acute drop in his hemoglobin to 9.1 . He has been on oral iron supplements 325 mg by mouth 3 times a day and has been intolerant with dyspepsia constipation and dark stools . Apparently in November he had a colonoscopy by Dr. Jennifer Ortega and benign polyps were removed and there was no active bleeding he also had a capsule enteroscopy and per patient it was negative. His last EGD in 2014 had shown healing of his gastric ulcer. Back in JAN 2015 he had bronchitis and flu like symptoms and took steroids and antibiotics. He responded to IV Iron and his Hb went up to normal range. His hemoglobin remains stable and in the normal range  He did have few months ago a repeat EGD by Dr. Bruno Books and results will be retrieved. He was recommended to continue on ranitidine and pantoprazole  It was explained to him that his oral iron absorption will be impaired due to the fact that he is on PPI with lack of stomach acidity. Should his hemoglobin dropped again below 11 he will benefit from parenteral IV iron   His last dose of IV iron was in February 2015  Hgb 13.6 on 9/12/16. He has been reassured. No treatment warranted      Hgb was 12.2  on 9/13/17. He has been reassured. He will be recommended oral iron supplements along with vitamin C to improve bioavailability and if his hemoglobin drops again he will be resumed on parenteral IV iron       Hgb was 13.5 on 12/13/17.   No treatment warranted     Hgb was 13.9  on 6/19/19. No treatment warranted    Hgb was 12.2 on 6/17/2020. His most recent iron studies were suggestive of iron deficiency. He received  parenteral IV iron with Feraheme on 6/25/2020 and 7/2/2020. Hgb was  13.2 on7/29/2020. He has been reassured. 10/21/2020  Most recent CBC today shows a drop in hemoglobin by 1 g to 12.2. Iron studies pending  He is to be resumed on parenteral IV iron and will receive 2 dose of Feraheme      12/07/2020  Received Feraheme on 10/22/2020 and 10/29/2020. Hemoglobin improved to the normal range at 14. He is to avoid NSAIDs and he continues on PPIs. 3/08/2021  Hgb stable at 14. No therapy warranted   New onset rise in transaminases which could be drug induced and related to his atorvastatin and possibly Naprosyn  It will be monitored and if it remains abnormal consideration for ultrasound of the liver will be addressed  He will follow with his PCP regarding his transaminitis. 7/19/2021  Hgb was 13.9 on 7/13/2021  His LFTs have returned to normal since discontinuation of Naprosyn. He continues on atorvastatin. His CBC and iron studies were unremarkable and no therapy warranted at this time. 1/19/2022  Hgb is 14.0 today. Rest of CBC normal with normal MCV and RDW  His CBC and CMP are unremarkable  His LFTs have returned to normal since he has cut down his NSAIDs intake  At this time he continues on atorvastatin. His last IV iron was about a year ago  No therapy warranted at this time. 7/20/2022  Doing well. No complaints denies any heartburn or melena. He continues on pantoprazole but he remains on baby aspirin and Plavix and again it was stressed to him the importance of avoiding NSAIDs since he had been taking as needed Naprosyn. His CBC and CMP today are in the normal range and he has no evidence of recurrent anemia. His examination is unremarkable.     Impression :  prior history of iron deficiency anemia in relation to gastritis requiring parenteral IV iron in the past.  No therapy warranted at this time. 1/18/2023    His main complaint remains joint aches. He has gained significant weight since last visit 21 pounds. No heartburn. No melena. He remains on pantoprazole and he also continues on his antiplatelet therapy with baby aspirin and Plavix. Physical examination is negative. Patient has prior history of iron deficiency anemia related to gastritis and he had required parenteral IV iron in the past.  His hemoglobin is stable at 14.4. He was reassured. No therapy warranted at this time. Gianna Wharton. Orlando Meza M.D., F.A.C.P.   Electronically signed 1/18/2023 at 9:13 AM

## 2023-03-09 ENCOUNTER — HOSPITAL ENCOUNTER (EMERGENCY)
Age: 68
Discharge: HOME | End: 2023-03-09
Attending: EMERGENCY MEDICINE
Payer: MEDICARE

## 2023-03-09 ENCOUNTER — APPOINTMENT (OUTPATIENT)
Dept: CT IMAGING | Age: 68
End: 2023-03-09
Payer: MEDICARE

## 2023-03-09 VITALS
OXYGEN SATURATION: 99 % | TEMPERATURE: 98.2 F | WEIGHT: 260 LBS | BODY MASS INDEX: 35.26 KG/M2 | RESPIRATION RATE: 18 BRPM | HEART RATE: 80 BPM | SYSTOLIC BLOOD PRESSURE: 124 MMHG | DIASTOLIC BLOOD PRESSURE: 71 MMHG

## 2023-03-09 DIAGNOSIS — N30.01 ACUTE CYSTITIS WITH HEMATURIA: Primary | ICD-10-CM

## 2023-03-09 DIAGNOSIS — K57.90 DIVERTICULOSIS: ICD-10-CM

## 2023-03-09 LAB
ALBUMIN SERPL-MCNC: 4 G/DL (ref 3.5–5.2)
ALP BLD-CCNC: 90 U/L (ref 40–129)
ALT SERPL-CCNC: 36 U/L (ref 0–40)
ANION GAP SERPL CALCULATED.3IONS-SCNC: 10 MMOL/L (ref 7–16)
AST SERPL-CCNC: 39 U/L (ref 0–39)
BACTERIA: ABNORMAL /HPF
BASOPHILS ABSOLUTE: 0.02 E9/L (ref 0–0.2)
BASOPHILS RELATIVE PERCENT: 0.3 % (ref 0–2)
BILIRUB SERPL-MCNC: 0.7 MG/DL (ref 0–1.2)
BILIRUBIN URINE: NEGATIVE
BLOOD, URINE: NEGATIVE
BUN BLDV-MCNC: 18 MG/DL (ref 6–23)
CALCIUM SERPL-MCNC: 9.4 MG/DL (ref 8.6–10.2)
CHLORIDE BLD-SCNC: 100 MMOL/L (ref 98–107)
CLARITY: ABNORMAL
CO2: 27 MMOL/L (ref 22–29)
COLOR: YELLOW
CREAT SERPL-MCNC: 1.1 MG/DL (ref 0.7–1.2)
EOSINOPHILS ABSOLUTE: 0.19 E9/L (ref 0.05–0.5)
EOSINOPHILS RELATIVE PERCENT: 2.5 % (ref 0–6)
EPITHELIAL CELLS, UA: ABNORMAL /HPF
GFR SERPL CREATININE-BSD FRML MDRD: >60 ML/MIN/1.73
GLUCOSE BLD-MCNC: 111 MG/DL (ref 74–99)
GLUCOSE URINE: NEGATIVE MG/DL
HCT VFR BLD CALC: 40 % (ref 37–54)
HEMOGLOBIN: 13.6 G/DL (ref 12.5–16.5)
HYALINE CASTS: ABNORMAL /LPF (ref 0–2)
IMMATURE GRANULOCYTES #: 0.04 E9/L
IMMATURE GRANULOCYTES %: 0.5 % (ref 0–5)
INR BLD: 1.1
KETONES, URINE: NEGATIVE MG/DL
LACTIC ACID: 1.1 MMOL/L (ref 0.5–2.2)
LEUKOCYTE ESTERASE, URINE: ABNORMAL
LIPASE: 26 U/L (ref 13–60)
LYMPHOCYTES ABSOLUTE: 1.13 E9/L (ref 1.5–4)
LYMPHOCYTES RELATIVE PERCENT: 14.9 % (ref 20–42)
MCH RBC QN AUTO: 28.5 PG (ref 26–35)
MCHC RBC AUTO-ENTMCNC: 34 % (ref 32–34.5)
MCV RBC AUTO: 83.7 FL (ref 80–99.9)
MONOCYTES ABSOLUTE: 1.04 E9/L (ref 0.1–0.95)
MONOCYTES RELATIVE PERCENT: 13.7 % (ref 2–12)
MUCUS: PRESENT /LPF
NEUTROPHILS ABSOLUTE: 5.18 E9/L (ref 1.8–7.3)
NEUTROPHILS RELATIVE PERCENT: 68.1 % (ref 43–80)
NITRITE, URINE: NEGATIVE
PDW BLD-RTO: 13 FL (ref 11.5–15)
PH UA: 6 (ref 5–9)
PLATELET # BLD: 293 E9/L (ref 130–450)
PMV BLD AUTO: 9.4 FL (ref 7–12)
POTASSIUM SERPL-SCNC: 3.8 MMOL/L (ref 3.5–5)
PROTEIN UA: 30 MG/DL
PROTHROMBIN TIME: 12.6 SEC (ref 9.3–12.4)
RBC # BLD: 4.78 E12/L (ref 3.8–5.8)
RBC UA: ABNORMAL /HPF (ref 0–2)
SODIUM BLD-SCNC: 137 MMOL/L (ref 132–146)
SPECIFIC GRAVITY UA: 1.02 (ref 1–1.03)
TOTAL PROTEIN: 7.9 G/DL (ref 6.4–8.3)
TROPONIN, HIGH SENSITIVITY: 7 NG/L (ref 0–11)
UROBILINOGEN, URINE: 0.2 E.U./DL
WBC # BLD: 7.6 E9/L (ref 4.5–11.5)
WBC UA: ABNORMAL /HPF (ref 0–5)

## 2023-03-09 PROCEDURE — 84484 ASSAY OF TROPONIN QUANT: CPT

## 2023-03-09 PROCEDURE — 80053 COMPREHEN METABOLIC PANEL: CPT

## 2023-03-09 PROCEDURE — 2580000003 HC RX 258: Performed by: EMERGENCY MEDICINE

## 2023-03-09 PROCEDURE — 96374 THER/PROPH/DIAG INJ IV PUSH: CPT

## 2023-03-09 PROCEDURE — 87088 URINE BACTERIA CULTURE: CPT

## 2023-03-09 PROCEDURE — 74177 CT ABD & PELVIS W/CONTRAST: CPT

## 2023-03-09 PROCEDURE — 83690 ASSAY OF LIPASE: CPT

## 2023-03-09 PROCEDURE — 81001 URINALYSIS AUTO W/SCOPE: CPT

## 2023-03-09 PROCEDURE — 6360000004 HC RX CONTRAST MEDICATION: Performed by: RADIOLOGY

## 2023-03-09 PROCEDURE — A4216 STERILE WATER/SALINE, 10 ML: HCPCS | Performed by: EMERGENCY MEDICINE

## 2023-03-09 PROCEDURE — 83605 ASSAY OF LACTIC ACID: CPT

## 2023-03-09 PROCEDURE — 99285 EMERGENCY DEPT VISIT HI MDM: CPT

## 2023-03-09 PROCEDURE — 85025 COMPLETE CBC W/AUTO DIFF WBC: CPT

## 2023-03-09 PROCEDURE — 2500000003 HC RX 250 WO HCPCS: Performed by: EMERGENCY MEDICINE

## 2023-03-09 PROCEDURE — 85610 PROTHROMBIN TIME: CPT

## 2023-03-09 RX ORDER — 0.9 % SODIUM CHLORIDE 0.9 %
1000 INTRAVENOUS SOLUTION INTRAVENOUS ONCE
Status: COMPLETED | OUTPATIENT
Start: 2023-03-09 | End: 2023-03-09

## 2023-03-09 RX ORDER — CEFDINIR 300 MG/1
300 CAPSULE ORAL 2 TIMES DAILY
Qty: 14 CAPSULE | Refills: 0 | Status: SHIPPED | OUTPATIENT
Start: 2023-03-09 | End: 2023-03-16

## 2023-03-09 RX ADMIN — IOPAMIDOL 75 ML: 755 INJECTION, SOLUTION INTRAVENOUS at 15:02

## 2023-03-09 RX ADMIN — FAMOTIDINE 20 MG: 10 INJECTION, SOLUTION INTRAVENOUS at 13:42

## 2023-03-09 RX ADMIN — SODIUM CHLORIDE 1000 ML: 9 INJECTION, SOLUTION INTRAVENOUS at 13:25

## 2023-03-09 ASSESSMENT — ENCOUNTER SYMPTOMS
BACK PAIN: 0
COUGH: 0
SHORTNESS OF BREATH: 0

## 2023-03-09 ASSESSMENT — LIFESTYLE VARIABLES: HOW MANY STANDARD DRINKS CONTAINING ALCOHOL DO YOU HAVE ON A TYPICAL DAY: PATIENT DOES NOT DRINK

## 2023-03-09 NOTE — ED PROVIDER NOTES
This is a 77-year-old male with a past medical history of hypertension and coronary artery disease who presents to the ED for evaluation of abdominal pain. Patient states that for the past 1 week or so he has been complaining of pain to his left upper quadrant patient states initially he was constipated and gassy he did take some stool softeners was able to have bowel movements and is now having diarrhea. Patient denies any appetite changes chest pain or shortness of breath. Patient states that this has resolved. Patient has no black or bloody stools. Patient states he is concerned because he has a history of diverticulitis. No other reported mitigating or exacerbating factors. The history is provided by the patient. Review of Systems   Constitutional:  Negative for fever. HENT:  Negative for congestion. Eyes:  Negative for visual disturbance. Respiratory:  Negative for cough and shortness of breath. Cardiovascular:  Negative for chest pain. Gastrointestinal:  Positive for abdominal pain. Endocrine: Negative for polyuria. Genitourinary:  Negative for dysuria. Musculoskeletal:  Negative for back pain. Skin:  Negative for rash. Allergic/Immunologic: Negative for immunocompromised state. Neurological:  Negative for headaches. Hematological:  Does not bruise/bleed easily. Psychiatric/Behavioral:  Negative for confusion. Physical Exam  Vitals and nursing note reviewed. Constitutional:       General: He is not in acute distress. Appearance: He is well-developed. HENT:      Head: Normocephalic and atraumatic. Neck:      Vascular: No JVD. Cardiovascular:      Rate and Rhythm: Normal rate and regular rhythm. Pulmonary:      Effort: Pulmonary effort is normal.   Abdominal:      General: There is no distension. Palpations: Abdomen is soft. Musculoskeletal:      Cervical back: Normal range of motion. Skin:     General: Skin is warm and dry.    Neurological: Mental Status: He is alert and oriented to person, place, and time. Cranial Nerves: No cranial nerve deficit. Psychiatric:         Behavior: Behavior normal.        Procedures     MDM  Number of Diagnoses or Management Options  Acute cystitis with hematuria  Diverticulosis  Diagnosis management comments: Patient is a pleasant 68-year-old male who presented to the ED for evaluation of several days of intermittent constipation followed by diarrhea as well as some dysuria. Patient have a complaint of some left-sided abdominal pain however had no signs of guarding or rigidity on exam.  Patient had a broad differential listed below was not limited to such. Liver enzymes lactic acid were within normal limits UA as well as CT imaging was concerning for cystitis in the setting of symptoms cultures were sent off he was started antibiotics recheck of abdomen was soft nontender no evidence suggest diverticulitis. Patient was appropriate for outpatient follow-up and given return precautions.                  Differential diagnosis: Diverticulitis, Perforation, Abscess, Pyelo, UTI  Review of ED/ Outpatient Records: Reviewed Dr. Geovanna Quispe note from 1/18/23  Historians that case was discussed with: None  My EKG interpretation: None  Imaging interpretation by myself: None  Independent Interpretation of labs: UA consistent with UTI, LA stable, liver enzymes show no significant changes  Discussed with other providers: None  Tests Considered but not ordered: None  Decision making tools/risks stratification: None  Disposition decision making/shared decision making: Shared  Chronic Conditions affecting care: Diverticulosis  Social Determinants of health impacting treatment or disposition: None  CODE status and Discussions: Full                    --------------------------------------------- PAST HISTORY ---------------------------------------------  Past Medical History:  has a past medical history of Anemia, CAD (coronary artery disease), Gastric ulcer, Gout, Hyperlipidemia, Hypertension, and Iron deficiency anemia due to chronic blood loss. Past Surgical History:  has a past surgical history that includes Cardiac surgery (stent placement); Colonoscopy; Diagnostic Cardiac Cath Lab Procedure (06/15/2007); and Diagnostic Cardiac Cath Lab Procedure (07/14/2008). Social History:  reports that he has quit smoking. He has never used smokeless tobacco. He reports that he does not drink alcohol and does not use drugs. Family History: family history includes Arrhythmia in his maternal grandmother; Heart Disease in his brother and mother. The patients home medications have been reviewed. Allergies: Patient has no known allergies.     -------------------------------------------------- RESULTS -------------------------------------------------  Labs:  Results for orders placed or performed during the hospital encounter of 03/09/23   CMP   Result Value Ref Range    Sodium 137 132 - 146 mmol/L    Potassium 3.8 3.5 - 5.0 mmol/L    Chloride 100 98 - 107 mmol/L    CO2 27 22 - 29 mmol/L    Anion Gap 10 7 - 16 mmol/L    Glucose 111 (H) 74 - 99 mg/dL    BUN 18 6 - 23 mg/dL    Creatinine 1.1 0.7 - 1.2 mg/dL    Est, Glom Filt Rate >60 >=60 mL/min/1.73    Calcium 9.4 8.6 - 10.2 mg/dL    Total Protein 7.9 6.4 - 8.3 g/dL    Albumin 4.0 3.5 - 5.2 g/dL    Total Bilirubin 0.7 0.0 - 1.2 mg/dL    Alkaline Phosphatase 90 40 - 129 U/L    ALT 36 0 - 40 U/L    AST 39 0 - 39 U/L   CBC with Auto Differential   Result Value Ref Range    WBC 7.6 4.5 - 11.5 E9/L    RBC 4.78 3.80 - 5.80 E12/L    Hemoglobin 13.6 12.5 - 16.5 g/dL    Hematocrit 40.0 37.0 - 54.0 %    MCV 83.7 80.0 - 99.9 fL    MCH 28.5 26.0 - 35.0 pg    MCHC 34.0 32.0 - 34.5 %    RDW 13.0 11.5 - 15.0 fL    Platelets 766 901 - 362 E9/L    MPV 9.4 7.0 - 12.0 fL    Neutrophils % 68.1 43.0 - 80.0 %    Immature Granulocytes % 0.5 0.0 - 5.0 %    Lymphocytes % 14.9 (L) 20.0 - 42.0 %    Monocytes % 13.7 (H) 2.0 - 12.0 %    Eosinophils % 2.5 0.0 - 6.0 %    Basophils % 0.3 0.0 - 2.0 %    Neutrophils Absolute 5.18 1.80 - 7.30 E9/L    Immature Granulocytes # 0.04 E9/L    Lymphocytes Absolute 1.13 (L) 1.50 - 4.00 E9/L    Monocytes Absolute 1.04 (H) 0.10 - 0.95 E9/L    Eosinophils Absolute 0.19 0.05 - 0.50 E9/L    Basophils Absolute 0.02 0.00 - 0.20 E9/L   Lipase   Result Value Ref Range    Lipase 26 13 - 60 U/L   Lactic Acid   Result Value Ref Range    Lactic Acid 1.1 0.5 - 2.2 mmol/L   Troponin   Result Value Ref Range    Troponin, High Sensitivity 7 0 - 11 ng/L   Urinalysis with Microscopic   Result Value Ref Range    Color, UA Yellow Straw/Yellow    Clarity, UA SLCLOUDY Clear    Glucose, Ur Negative Negative mg/dL    Bilirubin Urine Negative Negative    Ketones, Urine Negative Negative mg/dL    Specific Gravity, UA 1.020 1.005 - 1.030    Blood, Urine Negative Negative    pH, UA 6.0 5.0 - 9.0    Protein, UA 30 (A) Negative mg/dL    Urobilinogen, Urine 0.2 <2.0 E.U./dL    Nitrite, Urine Negative Negative    Leukocyte Esterase, Urine SMALL (A) Negative    Hyaline Casts, UA 0-2 0 - 2 /LPF    Mucus, UA Present (A) None Seen /LPF    WBC, UA 10-20 (A) 0 - 5 /HPF    RBC, UA NONE 0 - 2 /HPF    Epithelial Cells, UA MANY /HPF    Bacteria, UA MANY (A) None Seen /HPF   Protime-INR   Result Value Ref Range    Protime 12.6 (H) 9.3 - 12.4 sec    INR 1.1        Radiology:  CT ABDOMEN PELVIS W IV CONTRAST Additional Contrast? None   Final Result   Colonic diverticulosis with no evidence of acute diverticulitis. Suboptimally distended urinary bladder with mild thickening and subtle   stranding of the surrounding fat, suspicious for cystitis. Correlation with   urinalysis recommended. Enlarged prostate gland, no change. RECOMMENDATIONS:   Urinalysis.              ------------------------- NURSING NOTES AND VITALS REVIEWED ---------------------------  Date / Time Roomed:  3/9/2023 12:39 PM  ED Bed Assignment:  23/23    The nursing notes within the ED encounter and vital signs as below have been reviewed. /71   Pulse 80   Temp 98.2 °F (36.8 °C) (Oral)   Resp 18   Wt 260 lb (117.9 kg)   SpO2 99%   BMI 35.26 kg/m²   Oxygen Saturation Interpretation: Normal      ------------------------------------------ PROGRESS NOTES ------------------------------------------  1:33 PM EST  I have spoken with the patient and discussed todays results, in addition to providing specific details for the plan of care and counseling regarding the diagnosis and prognosis. Their questions are answered at this time and they are agreeable with the plan. I discussed at length with them reasons for immediate return here for re evaluation. They will followup with their PCP      --------------------------------- ADDITIONAL PROVIDER NOTES ---------------------------------  At this time the patient is without objective evidence of an acute process requiring hospitalization or inpatient management. They have remained hemodynamically stable throughout their entire ED visit and are stable for discharge with outpatient follow-up. The plan has been discussed in detail and they are aware of the specific conditions for emergent return, as well as the importance of follow-up. Discharge Medication List as of 3/9/2023  4:19 PM        START taking these medications    Details   cefdinir (OMNICEF) 300 MG capsule Take 1 capsule by mouth 2 times daily for 7 days, Disp-14 capsule, R-0Print             Diagnosis:  1. Acute cystitis with hematuria    2. Diverticulosis        Disposition:  Patient's disposition: Discharge to home  Patient's condition is stable.         Richard Rene DO  03/11/23 1036

## 2023-03-11 ASSESSMENT — ENCOUNTER SYMPTOMS: ABDOMINAL PAIN: 1

## 2023-03-12 LAB — URINE CULTURE, ROUTINE: NORMAL

## 2023-06-07 LAB
CHOLESTEROL, TOTAL: NORMAL
CHOLESTEROL/HDL RATIO: NORMAL
HDLC SERPL-MCNC: NORMAL MG/DL
LDL CHOLESTEROL CALCULATED: NORMAL
NONHDLC SERPL-MCNC: NORMAL MG/DL
TRIGL SERPL-MCNC: NORMAL MG/DL
VLDLC SERPL CALC-MCNC: NORMAL MG/DL

## 2023-06-30 ENCOUNTER — TELEPHONE (OUTPATIENT)
Dept: SURGERY | Age: 68
End: 2023-06-30

## 2023-06-30 ENCOUNTER — INITIAL CONSULT (OUTPATIENT)
Dept: SURGERY | Age: 68
End: 2023-06-30
Payer: MEDICARE

## 2023-06-30 VITALS
TEMPERATURE: 98.1 F | HEIGHT: 72 IN | SYSTOLIC BLOOD PRESSURE: 132 MMHG | HEART RATE: 74 BPM | BODY MASS INDEX: 33.86 KG/M2 | WEIGHT: 250 LBS | DIASTOLIC BLOOD PRESSURE: 81 MMHG

## 2023-06-30 DIAGNOSIS — K62.5 RECTAL BLEEDING: Primary | ICD-10-CM

## 2023-06-30 PROCEDURE — 3017F COLORECTAL CA SCREEN DOC REV: CPT | Performed by: SURGERY

## 2023-06-30 PROCEDURE — 1036F TOBACCO NON-USER: CPT | Performed by: SURGERY

## 2023-06-30 PROCEDURE — 99204 OFFICE O/P NEW MOD 45 MIN: CPT | Performed by: SURGERY

## 2023-06-30 PROCEDURE — G8417 CALC BMI ABV UP PARAM F/U: HCPCS | Performed by: SURGERY

## 2023-06-30 PROCEDURE — 3075F SYST BP GE 130 - 139MM HG: CPT | Performed by: SURGERY

## 2023-06-30 PROCEDURE — 1123F ACP DISCUSS/DSCN MKR DOCD: CPT | Performed by: SURGERY

## 2023-06-30 PROCEDURE — G8427 DOCREV CUR MEDS BY ELIG CLIN: HCPCS | Performed by: SURGERY

## 2023-06-30 PROCEDURE — 3079F DIAST BP 80-89 MM HG: CPT | Performed by: SURGERY

## 2023-06-30 RX ORDER — SODIUM CHLORIDE 0.9 % (FLUSH) 0.9 %
5-40 SYRINGE (ML) INJECTION PRN
OUTPATIENT
Start: 2023-06-30

## 2023-06-30 RX ORDER — SODIUM CHLORIDE 9 MG/ML
INJECTION, SOLUTION INTRAVENOUS PRN
OUTPATIENT
Start: 2023-06-30

## 2023-06-30 RX ORDER — SODIUM CHLORIDE 0.9 % (FLUSH) 0.9 %
5-40 SYRINGE (ML) INJECTION EVERY 12 HOURS SCHEDULED
OUTPATIENT
Start: 2023-06-30

## 2023-06-30 RX ORDER — SODIUM CHLORIDE 9 MG/ML
INJECTION, SOLUTION INTRAVENOUS CONTINUOUS
OUTPATIENT
Start: 2023-06-30

## 2023-07-19 ENCOUNTER — OFFICE VISIT (OUTPATIENT)
Dept: ONCOLOGY | Age: 68
End: 2023-07-19
Payer: MEDICARE

## 2023-07-19 ENCOUNTER — HOSPITAL ENCOUNTER (OUTPATIENT)
Dept: INFUSION THERAPY | Age: 68
Discharge: HOME | End: 2023-07-19
Payer: MEDICARE

## 2023-07-19 VITALS
OXYGEN SATURATION: 97 % | SYSTOLIC BLOOD PRESSURE: 124 MMHG | DIASTOLIC BLOOD PRESSURE: 78 MMHG | HEART RATE: 63 BPM | TEMPERATURE: 97 F | WEIGHT: 248.9 LBS | HEIGHT: 72 IN | BODY MASS INDEX: 33.71 KG/M2

## 2023-07-19 DIAGNOSIS — D50.0 IRON DEFICIENCY ANEMIA DUE TO CHRONIC BLOOD LOSS: ICD-10-CM

## 2023-07-19 DIAGNOSIS — D50.0 IRON DEFICIENCY ANEMIA DUE TO CHRONIC BLOOD LOSS: Primary | ICD-10-CM

## 2023-07-19 LAB
ALBUMIN SERPL-MCNC: 4.1 G/DL (ref 3.5–5.2)
ALP SERPL-CCNC: 71 U/L (ref 40–129)
ALT SERPL-CCNC: 21 U/L (ref 0–40)
ANION GAP SERPL CALCULATED.3IONS-SCNC: 8 MMOL/L (ref 7–16)
AST SERPL-CCNC: 22 U/L (ref 0–39)
BASOPHILS # BLD: 0.03 K/UL (ref 0–0.2)
BASOPHILS NFR BLD: 1 % (ref 0–2)
BILIRUB SERPL-MCNC: 0.7 MG/DL (ref 0–1.2)
BUN SERPL-MCNC: 12 MG/DL (ref 6–23)
CALCIUM SERPL-MCNC: 9.3 MG/DL (ref 8.6–10.2)
CHLORIDE SERPL-SCNC: 103 MMOL/L (ref 98–107)
CO2 SERPL-SCNC: 27 MMOL/L (ref 22–29)
CREAT SERPL-MCNC: 1 MG/DL (ref 0.7–1.2)
EOSINOPHIL # BLD: 0.13 K/UL (ref 0.05–0.5)
EOSINOPHILS RELATIVE PERCENT: 3 % (ref 0–6)
ERYTHROCYTE [DISTWIDTH] IN BLOOD BY AUTOMATED COUNT: 17.5 % (ref 11.5–15)
FERRITIN SERPL-MCNC: 20 NG/ML
GFR SERPL CREATININE-BSD FRML MDRD: >60 ML/MIN/1.73M2
GLUCOSE SERPL-MCNC: 112 MG/DL (ref 74–99)
HCT VFR BLD AUTO: 37.3 % (ref 37–54)
HGB BLD-MCNC: 12 G/DL (ref 12.5–16.5)
IMM GRANULOCYTES # BLD AUTO: <0.03 K/UL (ref 0–0.58)
IMM GRANULOCYTES NFR BLD: 0 % (ref 0–5)
IRON SATN MFR SERPL: 11 % (ref 20–55)
IRON SERPL-MCNC: 36 UG/DL (ref 59–158)
LYMPHOCYTES NFR BLD: 1.23 K/UL (ref 1.5–4)
LYMPHOCYTES RELATIVE PERCENT: 26 % (ref 20–42)
MCH RBC QN AUTO: 25 PG (ref 26–35)
MCHC RBC AUTO-ENTMCNC: 32.2 G/DL (ref 32–34.5)
MCV RBC AUTO: 77.7 FL (ref 80–99.9)
MONOCYTES NFR BLD: 0.62 K/UL (ref 0.1–0.95)
MONOCYTES NFR BLD: 13 % (ref 2–12)
NEUTROPHILS NFR BLD: 58 % (ref 43–80)
NEUTS SEG NFR BLD: 2.77 K/UL (ref 1.8–7.3)
PLATELET # BLD AUTO: 293 K/UL (ref 130–450)
PMV BLD AUTO: 9.3 FL (ref 7–12)
POTASSIUM SERPL-SCNC: 4.3 MMOL/L (ref 3.5–5)
PROT SERPL-MCNC: 6.9 G/DL (ref 6.4–8.3)
RBC # BLD AUTO: 4.8 M/UL (ref 3.8–5.8)
SODIUM SERPL-SCNC: 138 MMOL/L (ref 132–146)
TIBC SERPL-MCNC: 323 UG/DL (ref 250–450)
WBC OTHER # BLD: 4.8 K/UL (ref 4.5–11.5)

## 2023-07-19 PROCEDURE — 83540 ASSAY OF IRON: CPT

## 2023-07-19 PROCEDURE — 36415 COLL VENOUS BLD VENIPUNCTURE: CPT

## 2023-07-19 PROCEDURE — 99212 OFFICE O/P EST SF 10 MIN: CPT

## 2023-07-19 PROCEDURE — 83550 IRON BINDING TEST: CPT

## 2023-07-19 PROCEDURE — 85027 COMPLETE CBC AUTOMATED: CPT

## 2023-07-19 PROCEDURE — 82728 ASSAY OF FERRITIN: CPT

## 2023-07-19 PROCEDURE — 80053 COMPREHEN METABOLIC PANEL: CPT

## 2023-07-19 NOTE — PROGRESS NOTES
unremarkable. Impression :  prior history of iron deficiency anemia in relation to gastritis requiring parenteral IV iron in the past.  No therapy warranted at this time. 1/18/2023    His main complaint remains joint aches. He has gained significant weight since last visit 21 pounds. No heartburn. No melena. He remains on pantoprazole and he also continues on his antiplatelet therapy with baby aspirin and Plavix. Physical examination is negative. Patient has prior history of iron deficiency anemia related to gastritis and he had required parenteral IV iron in the past.  His hemoglobin is stable at 14.4. He was reassured. No therapy warranted at this time. 7/19/23  Feeling tired and fatigued with decreased exercise tolerance  C/o of joint pain. Takes occasional Naprosyn  He had a CBC done at his PCP with findings of hemoglobin of 11.6 with low MCV. His repeat CBC today shows a significant drop in his hemoglobin which was 14 in January and it is down to 12.0 with low MCV of 77. His iron studies today are pending. Patient continues on pantoprazole for his GERD and gastritis. He has history of NSAID related gastritis. He has had recurrent iron deficiency and had benefited in the past from parenteral IV iron. He is to return next week for IV Feraheme. He has poor oral iron absorption the fact that he takes PPI. Again it was stressed to him the importance of cutting down the intake of NSAIDs to prevent recurrent gastritis and potential for GI bleed. Velma Zuleta. Benji Alexander M.D., F.A.C.P.   Electronically signed 7/19/2023 at 12:16 PM

## 2023-07-24 ENCOUNTER — HOSPITAL ENCOUNTER (OUTPATIENT)
Dept: INFUSION THERAPY | Age: 68
Discharge: HOME | End: 2023-07-24
Payer: MEDICARE

## 2023-07-24 VITALS
RESPIRATION RATE: 16 BRPM | OXYGEN SATURATION: 98 % | TEMPERATURE: 97.2 F | SYSTOLIC BLOOD PRESSURE: 127 MMHG | DIASTOLIC BLOOD PRESSURE: 79 MMHG | HEART RATE: 71 BPM

## 2023-07-24 DIAGNOSIS — D64.9 ANEMIA, UNSPECIFIED TYPE: ICD-10-CM

## 2023-07-24 DIAGNOSIS — Z86.2 H/O: IRON DEFICIENCY ANEMIA: ICD-10-CM

## 2023-07-24 DIAGNOSIS — D50.0 IRON DEFICIENCY ANEMIA DUE TO CHRONIC BLOOD LOSS: Primary | ICD-10-CM

## 2023-07-24 PROCEDURE — 2580000003 HC RX 258: Performed by: NURSE PRACTITIONER

## 2023-07-24 PROCEDURE — 96365 THER/PROPH/DIAG IV INF INIT: CPT

## 2023-07-24 PROCEDURE — 6360000002 HC RX W HCPCS: Performed by: NURSE PRACTITIONER

## 2023-07-24 RX ORDER — ONDANSETRON 2 MG/ML
8 INJECTION INTRAMUSCULAR; INTRAVENOUS
Status: CANCELLED | OUTPATIENT
Start: 2023-07-24

## 2023-07-24 RX ORDER — EPINEPHRINE 1 MG/ML
0.3 INJECTION, SOLUTION, CONCENTRATE INTRAVENOUS PRN
Status: CANCELLED | OUTPATIENT
Start: 2023-07-31

## 2023-07-24 RX ORDER — SODIUM CHLORIDE 9 MG/ML
INJECTION, SOLUTION INTRAVENOUS CONTINUOUS
Status: CANCELLED | OUTPATIENT
Start: 2023-07-24

## 2023-07-24 RX ORDER — HEPARIN 100 UNIT/ML
500 SYRINGE INTRAVENOUS PRN
Status: CANCELLED | OUTPATIENT
Start: 2023-07-24

## 2023-07-24 RX ORDER — ACETAMINOPHEN 325 MG/1
650 TABLET ORAL
Status: CANCELLED | OUTPATIENT
Start: 2023-07-24

## 2023-07-24 RX ORDER — SODIUM CHLORIDE 9 MG/ML
5-250 INJECTION, SOLUTION INTRAVENOUS PRN
Status: CANCELLED | OUTPATIENT
Start: 2023-07-31

## 2023-07-24 RX ORDER — SODIUM CHLORIDE 9 MG/ML
INJECTION, SOLUTION INTRAVENOUS CONTINUOUS
Status: CANCELLED | OUTPATIENT
Start: 2023-07-31

## 2023-07-24 RX ORDER — SODIUM CHLORIDE 0.9 % (FLUSH) 0.9 %
5-40 SYRINGE (ML) INJECTION PRN
Status: CANCELLED | OUTPATIENT
Start: 2023-07-31

## 2023-07-24 RX ORDER — DIPHENHYDRAMINE HYDROCHLORIDE 50 MG/ML
50 INJECTION INTRAMUSCULAR; INTRAVENOUS
Status: CANCELLED | OUTPATIENT
Start: 2023-07-31

## 2023-07-24 RX ORDER — ALBUTEROL SULFATE 90 UG/1
4 AEROSOL, METERED RESPIRATORY (INHALATION) PRN
Status: CANCELLED | OUTPATIENT
Start: 2023-07-31

## 2023-07-24 RX ORDER — DIPHENHYDRAMINE HYDROCHLORIDE 50 MG/ML
50 INJECTION INTRAMUSCULAR; INTRAVENOUS
Status: CANCELLED | OUTPATIENT
Start: 2023-07-24

## 2023-07-24 RX ORDER — EPINEPHRINE 1 MG/ML
0.3 INJECTION, SOLUTION, CONCENTRATE INTRAVENOUS PRN
Status: CANCELLED | OUTPATIENT
Start: 2023-07-24

## 2023-07-24 RX ORDER — SODIUM CHLORIDE 9 MG/ML
5-250 INJECTION, SOLUTION INTRAVENOUS PRN
Status: DISCONTINUED | OUTPATIENT
Start: 2023-07-24 | End: 2023-07-25 | Stop reason: HOSPADM

## 2023-07-24 RX ORDER — HEPARIN 100 UNIT/ML
500 SYRINGE INTRAVENOUS PRN
Status: CANCELLED | OUTPATIENT
Start: 2023-07-31

## 2023-07-24 RX ORDER — FAMOTIDINE 10 MG/ML
20 INJECTION, SOLUTION INTRAVENOUS
Status: CANCELLED | OUTPATIENT
Start: 2023-07-31

## 2023-07-24 RX ORDER — SODIUM CHLORIDE 9 MG/ML
5-250 INJECTION, SOLUTION INTRAVENOUS PRN
Status: CANCELLED | OUTPATIENT
Start: 2023-07-24

## 2023-07-24 RX ORDER — SODIUM CHLORIDE 0.9 % (FLUSH) 0.9 %
5-40 SYRINGE (ML) INJECTION PRN
Status: CANCELLED | OUTPATIENT
Start: 2023-07-24

## 2023-07-24 RX ORDER — ALBUTEROL SULFATE 90 UG/1
4 AEROSOL, METERED RESPIRATORY (INHALATION) PRN
Status: CANCELLED | OUTPATIENT
Start: 2023-07-24

## 2023-07-24 RX ORDER — ACETAMINOPHEN 325 MG/1
650 TABLET ORAL
Status: CANCELLED | OUTPATIENT
Start: 2023-07-31

## 2023-07-24 RX ORDER — ONDANSETRON 2 MG/ML
8 INJECTION INTRAMUSCULAR; INTRAVENOUS
Status: CANCELLED | OUTPATIENT
Start: 2023-07-31

## 2023-07-24 RX ORDER — FAMOTIDINE 10 MG/ML
20 INJECTION, SOLUTION INTRAVENOUS
Status: CANCELLED | OUTPATIENT
Start: 2023-07-24

## 2023-07-24 RX ADMIN — FERUMOXYTOL 510 MG: 510 INJECTION INTRAVENOUS at 10:04

## 2023-07-24 RX ADMIN — SODIUM CHLORIDE 20 ML/HR: 9 INJECTION, SOLUTION INTRAVENOUS at 10:05

## 2023-07-25 NOTE — PROGRESS NOTES
[address] [facility]                                                                                                                    PRE OP INSTRUCTIONS FOR  Ainsley Navarrete        Date: 7/25/2023    Date of surgery: 07/26/2023   Arrival Time: Hospital will call you between 5pm and 7pm with your final arrival time for surgery    Nothing by mouth (NPO) as instructed.____________________________________________________________________    Take the following medications with a small sip of water on the morning of Surgery: metoprolol     Diabetics may take evening dose of insulin but none after midnight. If you feel symptomatic or low blood sugar morning of surgery drink 1-2 ounces of apple juice only. Aspirin, Ibuprofen, Advil, Naproxen, Vitamin E and other Anti-inflammatory products should be stopped  before surgery  as directed by your physician. Take Tylenol only unless instructed otherwise by your surgeon. Check with your Doctor regarding stopping Plavix, Coumadin, Lovenox, Eliquis, Effient, or other blood thinners. Do not smoke,use illicit drugs and do not drink any alcoholic beverages 24 hours prior to surgery. You may brush your teeth the morning of surgery. DO NOT SWALLOW WATER    You MUST make arrangements for a responsible adult to take you home after your surgery. You will not be allowed to leave alone or drive yourself home. It is strongly suggested someone stay with you the first 24 hrs. Your surgery will be cancelled if you do not have a ride home. PEDIATRIC PATIENTS ONLY:  A parent/legal guardian must accompany a child scheduled for surgery and plan to stay at the hospital until the child is discharged. Please do not bring other children with you. Please wear simple, loose fitting clothing to the hospital.  Ericamakenzie Braeden not bring valuables (money, credit cards, checkbooks, etc.) Do not wear any makeup (including no eye makeup) or nail polish on your fingers or toes.     DO NOT wear any jewelry or piercings on day of surgery. All body piercing jewelry must be removed. Shower the night before surgery with ___Antibacterial soap /ALEXI WIPES________    TOTAL JOINT REPLACEMENT/HYSTERECTOMY PATIENTS ONLY---Remember to bring Blood Bank bracelet to the hospital on the day of surgery. If you have a Living Will and Durable Power of  for Healthcare, please bring in a copy. If appropriate bring crutches, inspirex, WALKER, CANE etc... Notify your Surgeon if you develop any illness between now and surgery time, cough, cold, fever, sore throat, nausea, vomiting, etc.  Please notify your surgeon if you experience dizziness, shortness of breath or blurred vision between now & the time of your surgery. If you have ___dentures, they will be removed before going to the OR; we will provide you a container. If you wear ___contact lenses or ___glasses, they will be removed; please bring a case for them. To provide excellent care visitors will be limited to 2 in the room at any given time. Please bring picture ID and insurance card. During flu season no children under the age of 15 are permitted in the hospital for the safety of all patients. Other    Any implantable device requiring remote therapy, Please bring remote day of surgery and bring your implant card with you! Please call AMBULATORY CARE if you have any further questions.    [address]

## 2023-07-26 ENCOUNTER — HOSPITAL ENCOUNTER (OUTPATIENT)
Age: 68
Setting detail: OUTPATIENT SURGERY
Discharge: HOME | End: 2023-07-26
Attending: SURGERY | Admitting: SURGERY
Payer: MEDICARE

## 2023-07-26 ENCOUNTER — ANESTHESIA EVENT (OUTPATIENT)
Dept: ENDOSCOPY | Age: 68
End: 2023-07-26
Payer: MEDICARE

## 2023-07-26 ENCOUNTER — ANESTHESIA (OUTPATIENT)
Dept: ENDOSCOPY | Age: 68
End: 2023-07-26
Payer: MEDICARE

## 2023-07-26 VITALS
RESPIRATION RATE: 18 BRPM | BODY MASS INDEX: 32.78 KG/M2 | WEIGHT: 242 LBS | DIASTOLIC BLOOD PRESSURE: 80 MMHG | TEMPERATURE: 97.3 F | OXYGEN SATURATION: 95 % | SYSTOLIC BLOOD PRESSURE: 119 MMHG | HEIGHT: 72 IN | HEART RATE: 58 BPM

## 2023-07-26 DIAGNOSIS — K62.5 RECTAL BLEEDING: ICD-10-CM

## 2023-07-26 PROCEDURE — 2580000003 HC RX 258: Performed by: SURGERY

## 2023-07-26 PROCEDURE — 3609010300 HC COLONOSCOPY W/BIOPSY SINGLE/MULTIPLE: Performed by: SURGERY

## 2023-07-26 PROCEDURE — 3700000001 HC ADD 15 MINUTES (ANESTHESIA): Performed by: SURGERY

## 2023-07-26 PROCEDURE — 3700000000 HC ANESTHESIA ATTENDED CARE: Performed by: SURGERY

## 2023-07-26 PROCEDURE — 2709999900 HC NON-CHARGEABLE SUPPLY: Performed by: SURGERY

## 2023-07-26 PROCEDURE — 45380 COLONOSCOPY AND BIOPSY: CPT | Performed by: SURGERY

## 2023-07-26 PROCEDURE — 7100000010 HC PHASE II RECOVERY - FIRST 15 MIN: Performed by: SURGERY

## 2023-07-26 PROCEDURE — 88305 TISSUE EXAM BY PATHOLOGIST: CPT

## 2023-07-26 PROCEDURE — 7100000011 HC PHASE II RECOVERY - ADDTL 15 MIN: Performed by: SURGERY

## 2023-07-26 PROCEDURE — 6360000002 HC RX W HCPCS

## 2023-07-26 RX ORDER — FENTANYL CITRATE 50 UG/ML
INJECTION, SOLUTION INTRAMUSCULAR; INTRAVENOUS PRN
Status: DISCONTINUED | OUTPATIENT
Start: 2023-07-26 | End: 2023-07-26 | Stop reason: SDUPTHER

## 2023-07-26 RX ORDER — SODIUM CHLORIDE 0.9 % (FLUSH) 0.9 %
5-40 SYRINGE (ML) INJECTION EVERY 12 HOURS SCHEDULED
Status: DISCONTINUED | OUTPATIENT
Start: 2023-07-26 | End: 2023-07-26 | Stop reason: HOSPADM

## 2023-07-26 RX ORDER — PROPOFOL 10 MG/ML
INJECTION, EMULSION INTRAVENOUS PRN
Status: DISCONTINUED | OUTPATIENT
Start: 2023-07-26 | End: 2023-07-26 | Stop reason: SDUPTHER

## 2023-07-26 RX ORDER — SODIUM CHLORIDE 0.9 % (FLUSH) 0.9 %
5-40 SYRINGE (ML) INJECTION PRN
Status: DISCONTINUED | OUTPATIENT
Start: 2023-07-26 | End: 2023-07-26 | Stop reason: HOSPADM

## 2023-07-26 RX ORDER — SODIUM CHLORIDE 9 MG/ML
INJECTION, SOLUTION INTRAVENOUS PRN
Status: DISCONTINUED | OUTPATIENT
Start: 2023-07-26 | End: 2023-07-26 | Stop reason: HOSPADM

## 2023-07-26 RX ORDER — HYDROCORTISONE ACETATE 25 MG/1
25 SUPPOSITORY RECTAL EVERY 12 HOURS
Qty: 10 SUPPOSITORY | Refills: 0 | Status: SHIPPED | OUTPATIENT
Start: 2023-07-26 | End: 2023-07-31

## 2023-07-26 RX ORDER — SODIUM CHLORIDE 9 MG/ML
INJECTION, SOLUTION INTRAVENOUS CONTINUOUS
Status: DISCONTINUED | OUTPATIENT
Start: 2023-07-26 | End: 2023-07-26 | Stop reason: HOSPADM

## 2023-07-26 RX ADMIN — FENTANYL CITRATE 50 MCG: 50 INJECTION, SOLUTION INTRAMUSCULAR; INTRAVENOUS at 09:51

## 2023-07-26 RX ADMIN — PROPOFOL 150 MG: 10 INJECTION, EMULSION INTRAVENOUS at 09:46

## 2023-07-26 RX ADMIN — FENTANYL CITRATE 50 MCG: 50 INJECTION, SOLUTION INTRAMUSCULAR; INTRAVENOUS at 09:46

## 2023-07-26 RX ADMIN — SODIUM CHLORIDE: 900 INJECTION, SOLUTION INTRAVENOUS at 09:43

## 2023-07-26 ASSESSMENT — LIFESTYLE VARIABLES: SMOKING_STATUS: 0

## 2023-07-26 NOTE — H&P
Luis Antonio Patton  7/26/2023  [address] [facility], [place]              History and Physical    Chief Complaint: rectal bleeding      Luis Antonio Patton is a 75 y.o., male with a long history of occasional rectal bleeding, had hemorrhoid rubber banding many years ago, he is due for another colonoscopy. Has anemia and has had IV iron in the past. Takes Plavix for heart stents. Past Medical History:   Diagnosis Date    Anemia     CAD (coronary artery disease)     Gastric ulcer     Gout     Hyperlipidemia     Hypertension     Iron deficiency anemia due to chronic blood loss 6/17/2020     Past Surgical History:   Procedure Laterality Date    BAND HEMORRHOIDECTOMY      CARDIAC SURGERY  stent placement    COLONOSCOPY      CORONARY ANGIOPLASTY WITH STENT PLACEMENT      x5    DIAGNOSTIC CARDIAC CATH LAB PROCEDURE  06/15/2007    [facility]: Severe two-vessel disease in LAD and LCX in left-dominant system. PCI to the LCX with BMS and LAD with PES     DIAGNOSTIC CARDIAC CATH LAB PROCEDURE  07/14/2008    [facility]: LM Normal. LAD mild disease with severe ostial lesion of diagonal branch. Mild LAD stent patent without instent restenosis, LCX with mild proximal disease and complete occlusion in middle segment, RCA nondominant with severe disease or proximal segment. Successful PCI of mid CX instent restenosis with ZES and proximal RCA ZES. Home Medications  Prior to Visit Medications    Medication Sig Taking?  Authorizing Provider   tiZANidine (ZANAFLEX) 2 MG tablet Take 1 tablet by mouth every 6 hours as needed  Patient not taking: Reported on 7/26/2023  Historical Provider, MD   Phenylephrine-DM-GG ([place] [facility] FAST-MAX CONGEST COUGH) 5- MG TABS Take as directed on the package for cough or congestion  [facility], APRN - CNP   aspirin 81 MG tablet Take 1 tablet by mouth daily  Historical Provider, MD   Nutritional Supplements (ENSURE ACTIVE PO) Take by mouth  Historical Provider, MD   atorvastatin (LIPITOR) 40 MG

## 2023-07-26 NOTE — OP NOTE
[address] [facility]    Operative Report    DATE OF PROCEDURE: 7/26/2023    SURGEON: Dr. Germaine Morales M.D. Surgical Assistant: Kelsi Neil RN     PREOPERATIVE DIAGNOSES:   Rectal bleeding  Internal hemorrhoids    POSTOPERATIVE DIAGNOSES:   7/26/2023 Colonoscopy done for rectal bleeding showed internal hemorrhoids, no active bleeding and a 4 mm ascending colon polyp. OPERATION:   Colonoscopy to the cecum  with biopsy removal polyp in the ascending colon    ANESTHESIA: LMAC. BLOOD LOSS: none  SPECIMEN: colon polyp    History and consent: This is a 75y.o. year old male who needs to have a colonoscopy. I have discussed with the patient the indication, alternatives, and the possible risks and/or complications of the colonoscopy and the anesthesia. The patient appears to understand and agrees to proceed. Mag Citrate was given two days ago to start the bowels moving, then a Myralax bowel prep was done yesterday until the bowels were clear. Monitoring and Safety:    Anaesthesia monitored vital signs, BP cuff, pulse oximetry, cardiac monitor and level of consciousness until recovered. OPERATIONS:  The patient was placed on the table and sedated by anaesthesia. An anal exam was done, large external hemorrhoidal tags were present, no thrombosis or bleeding. The lubricated scope was passed into the rectum and retroflexed which showed large internal hemorrhoids, no active bleeding. The scope was passed up through the sigmoid, then all the way around to the cecum. A 4 mm polyp was found and removed with the biopsy forceps from the ascending colon at the cecum. The scope was slowly withdrawn, each area was examined again on the way out. No other polyps were seen. The scope was removed. The patient tolerated the procedure well. Complications: none    Plan:    Anusol suppositories for the rectal bleeding.  He will need a PPH hemorrhoidectomy if the rectal

## 2023-07-26 NOTE — ANESTHESIA POSTPROCEDURE EVALUATION
Department of Anesthesiology  Postprocedure Note    Patient: Shamar Proctor  MRN: [id number]  YOB: 1955  Date of evaluation: 7/26/2023      Procedure Summary     Date: 07/26/23 Room / Location: [address] [place] [address] / [address] [place] [address]    Anesthesia Start: 8003 Anesthesia Stop: 1929    Procedure: COLONOSCOPY WITH BIOPSY Diagnosis:       Rectal bleeding      (Rectal bleeding [K62.5])    Surgeons: Vidya Calhoun MD Responsible Provider: Chayo Wolff MD    Anesthesia Type: MAC ASA Status: 3          Anesthesia Type: MAC    Ivory Phase I: Ivory Score: 10    Ivory Phase II:        Anesthesia Post Evaluation    Patient location during evaluation: PACU  Patient participation: complete - patient participated  Level of consciousness: awake and alert  Airway patency: patent  Nausea & Vomiting: no nausea and no vomiting  Complications: no  Cardiovascular status: hemodynamically stable  Respiratory status: acceptable  Hydration status: euvolemic

## 2023-07-26 NOTE — ANESTHESIA PRE PROCEDURE
Department of Anesthesiology  Preprocedure Note       Name:  Rober Slaughter   Age:  75 y.o.  :  1955                                          MRN:  [id number]         Date:  2023      Surgeon: Darcy Beasley):  Eula Essex, MD    Procedure: Procedure(s):  COLONOSCOPY    Medications prior to admission:   Prior to Admission medications    Medication Sig Start Date End Date Taking? Authorizing Provider   tiZANidine (ZANAFLEX) 2 MG tablet Take 1 tablet by mouth every 6 hours as needed  Patient not taking: Reported on 2023    Historical Provider, MD   Phenylephrine-DM-GG ([facility] FAST-MAX CONGEST COUGH) 5- MG TABS Take as directed on the package for cough or congestion 19   DEBORA Corral - CNP   aspirin 81 MG tablet Take 1 tablet by mouth daily    Historical Provider, MD   Nutritional Supplements (ENSURE ACTIVE PO) Take by mouth    Historical Provider, MD   atorvastatin (LIPITOR) 40 MG tablet Take 1 tablet by mouth daily 18   Thiago Lama MD   Cholecalciferol (VITAMIN D3) 67001 units CAPS Take by mouth    Historical Provider, MD   lisinopril (PRINIVIL;ZESTRIL) 5 MG tablet Take 1 tablet by mouth daily    Historical Provider, MD   clopidogrel (PLAVIX) 75 MG tablet TAKE ONE TABLET BY MOUTH EVERY DAY 10/12/17   Vadim Luo MD   pantoprazole (PROTONIX) 40 MG tablet Take 1 tablet by mouth daily 17   Vadim Luo MD   Omega-3 Fatty Acids (OMEGA-3 FISH OIL PO) Take by mouth daily     Historical Provider, MD   docusate sodium (COLACE) 100 MG capsule Take 1 capsule by mouth 2 times daily as needed    Historical Provider, MD   colchicine 0.6 MG tablet Take 1 tablet by mouth as needed    Historical Provider, MD   Coenzyme Q10 (CO Q 10 PO) Take 100 mg by mouth daily.       Historical Provider, MD   metoprolol (TOPROL-XL) 25 MG XL tablet Take 1 tablet by mouth daily    Historical Provider, MD   nitroGLYCERIN (NITROSTAT) 0.4 MG SL tablet Place 1 tablet under the tongue as needed

## 2023-07-31 ENCOUNTER — HOSPITAL ENCOUNTER (OUTPATIENT)
Dept: INFUSION THERAPY | Age: 68
Discharge: HOME | End: 2023-07-31
Payer: MEDICARE

## 2023-07-31 VITALS
OXYGEN SATURATION: 95 % | HEART RATE: 59 BPM | DIASTOLIC BLOOD PRESSURE: 68 MMHG | TEMPERATURE: 96.9 F | SYSTOLIC BLOOD PRESSURE: 110 MMHG | RESPIRATION RATE: 18 BRPM

## 2023-07-31 DIAGNOSIS — D50.0 IRON DEFICIENCY ANEMIA DUE TO CHRONIC BLOOD LOSS: Primary | ICD-10-CM

## 2023-07-31 DIAGNOSIS — D64.9 ANEMIA, UNSPECIFIED TYPE: ICD-10-CM

## 2023-07-31 DIAGNOSIS — Z86.2 H/O: IRON DEFICIENCY ANEMIA: ICD-10-CM

## 2023-07-31 LAB — SURGICAL PATHOLOGY REPORT: NORMAL

## 2023-07-31 PROCEDURE — 96365 THER/PROPH/DIAG IV INF INIT: CPT

## 2023-07-31 PROCEDURE — 2580000003 HC RX 258: Performed by: NURSE PRACTITIONER

## 2023-07-31 PROCEDURE — 6360000002 HC RX W HCPCS: Performed by: NURSE PRACTITIONER

## 2023-07-31 RX ORDER — ACETAMINOPHEN 325 MG/1
650 TABLET ORAL
OUTPATIENT
Start: 2023-07-31

## 2023-07-31 RX ORDER — ONDANSETRON 2 MG/ML
8 INJECTION INTRAMUSCULAR; INTRAVENOUS
OUTPATIENT
Start: 2023-07-31

## 2023-07-31 RX ORDER — ALBUTEROL SULFATE 90 UG/1
4 AEROSOL, METERED RESPIRATORY (INHALATION) PRN
OUTPATIENT
Start: 2023-07-31

## 2023-07-31 RX ORDER — SODIUM CHLORIDE 0.9 % (FLUSH) 0.9 %
5-40 SYRINGE (ML) INJECTION PRN
OUTPATIENT
Start: 2023-07-31

## 2023-07-31 RX ORDER — SODIUM CHLORIDE 9 MG/ML
5-250 INJECTION, SOLUTION INTRAVENOUS PRN
Status: DISCONTINUED | OUTPATIENT
Start: 2023-07-31 | End: 2023-08-01 | Stop reason: HOSPADM

## 2023-07-31 RX ORDER — DIPHENHYDRAMINE HYDROCHLORIDE 50 MG/ML
50 INJECTION INTRAMUSCULAR; INTRAVENOUS
OUTPATIENT
Start: 2023-07-31

## 2023-07-31 RX ORDER — FAMOTIDINE 10 MG/ML
20 INJECTION, SOLUTION INTRAVENOUS
OUTPATIENT
Start: 2023-07-31

## 2023-07-31 RX ORDER — SODIUM CHLORIDE 0.9 % (FLUSH) 0.9 %
5-40 SYRINGE (ML) INJECTION PRN
Status: DISCONTINUED | OUTPATIENT
Start: 2023-07-31 | End: 2023-08-01 | Stop reason: HOSPADM

## 2023-07-31 RX ORDER — SODIUM CHLORIDE 9 MG/ML
INJECTION, SOLUTION INTRAVENOUS CONTINUOUS
OUTPATIENT
Start: 2023-07-31

## 2023-07-31 RX ORDER — SODIUM CHLORIDE 9 MG/ML
5-250 INJECTION, SOLUTION INTRAVENOUS PRN
OUTPATIENT
Start: 2023-07-31

## 2023-07-31 RX ORDER — EPINEPHRINE 1 MG/ML
0.3 INJECTION, SOLUTION, CONCENTRATE INTRAVENOUS PRN
OUTPATIENT
Start: 2023-07-31

## 2023-07-31 RX ORDER — HEPARIN 100 UNIT/ML
500 SYRINGE INTRAVENOUS PRN
OUTPATIENT
Start: 2023-07-31

## 2023-07-31 RX ORDER — SODIUM CHLORIDE 9 MG/ML
5-250 INJECTION, SOLUTION INTRAVENOUS PRN
Status: CANCELLED | OUTPATIENT
Start: 2023-07-31

## 2023-07-31 RX ADMIN — SODIUM CHLORIDE 150 ML/HR: 9 INJECTION, SOLUTION INTRAVENOUS at 09:38

## 2023-07-31 RX ADMIN — FERUMOXYTOL 510 MG: 510 INJECTION INTRAVENOUS at 09:40

## 2023-07-31 RX ADMIN — SODIUM CHLORIDE, PRESERVATIVE FREE 10 ML: 5 INJECTION INTRAVENOUS at 09:37

## 2023-08-04 ENCOUNTER — OFFICE VISIT (OUTPATIENT)
Dept: SURGERY | Age: 68
End: 2023-08-04

## 2023-08-04 VITALS
WEIGHT: 242 LBS | DIASTOLIC BLOOD PRESSURE: 87 MMHG | HEIGHT: 72 IN | SYSTOLIC BLOOD PRESSURE: 143 MMHG | TEMPERATURE: 97.9 F | BODY MASS INDEX: 32.78 KG/M2 | HEART RATE: 76 BPM

## 2023-08-04 DIAGNOSIS — K62.5 RECTAL BLEEDING: Primary | ICD-10-CM

## 2023-08-04 DIAGNOSIS — D12.3 ADENOMATOUS POLYP OF TRANSVERSE COLON: ICD-10-CM

## 2023-08-04 RX ORDER — AZITHROMYCIN 250 MG/1
TABLET, FILM COATED ORAL
COMMUNITY
Start: 2023-07-24

## 2023-08-04 RX ORDER — HYDROCORTISONE ACETATE 25 MG/1
25 SUPPOSITORY RECTAL EVERY 12 HOURS
Qty: 10 SUPPOSITORY | Refills: 1 | Status: SHIPPED | OUTPATIENT
Start: 2023-08-04

## 2023-08-04 RX ORDER — TAMSULOSIN HYDROCHLORIDE 0.4 MG/1
CAPSULE ORAL
COMMUNITY
Start: 2023-06-12

## 2023-08-04 NOTE — PROGRESS NOTES
Rober Slaughter  8/4/2023  Critical access hospital office visit    Chief Complaint: rectal bleeding,colon polyps    Rober Slaughter is a 70 y.o., male, with a long history of occasional rectal bleeding, had hemorrhoid rubber banding many years ago. Has anemia and has had IV iron in the past. Takes Plavix for heart stents. 7/26/2023 Colonoscopy showed internal hemorrhoids, no active bleeding and a 4 mm ascending colon polyp. Past Medical History:   Diagnosis Date    Anemia     CAD (coronary artery disease)     Gastric ulcer     Gout     Hyperlipidemia     Hypertension     Iron deficiency anemia due to chronic blood loss 6/17/2020     Past Surgical History:   Procedure Laterality Date    BAND HEMORRHOIDECTOMY      CARDIAC SURGERY  stent placement    COLONOSCOPY      COLONOSCOPY N/A 7/26/2023    [facility]SY [facility]      x5    DIAGNOSTIC CARDIAC CATH LAB PROCEDURE  06/15/2007    [facility]: Severe two-vessel disease in LAD and LCX in left-dominant system. PCI to the LCX with BMS and LAD with PES     DIAGNOSTIC CARDIAC CATH LAB PROCEDURE  07/14/2008    [facility]: LM Normal. LAD mild disease with severe ostial lesion of diagonal branch. Mild LAD stent patent without instent restenosis, LCX with mild proximal disease and complete occlusion in middle segment, RCA nondominant with severe disease or proximal segment. Successful PCI of mid CX instent restenosis with ZES and proximal RCA ZES. Home Medications  Prior to Visit Medications    Medication Sig Taking?  Authorizing Provider   tamsulosin (FLOMAX) 0.4 MG capsule   Historical Provider, MD   azithromycin (ZITHROMAX) 250 MG tablet   Historical Provider, MD   tiZANidine (ZANAFLEX) 2 MG tablet Take 1 tablet by mouth every 6 hours as needed  Patient not taking: Reported on 7/26/2023  Historical Provider, MD   Phenylephrine-DM-GG [facility]

## 2023-08-16 ENCOUNTER — OFFICE VISIT (OUTPATIENT)
Dept: ONCOLOGY | Age: 68
End: 2023-08-16

## 2023-08-16 ENCOUNTER — HOSPITAL ENCOUNTER (OUTPATIENT)
Dept: INFUSION THERAPY | Age: 68
Discharge: HOME | End: 2023-08-16
Payer: MEDICARE

## 2023-08-16 VITALS
DIASTOLIC BLOOD PRESSURE: 74 MMHG | SYSTOLIC BLOOD PRESSURE: 121 MMHG | OXYGEN SATURATION: 100 % | TEMPERATURE: 98 F | RESPIRATION RATE: 16 BRPM | HEART RATE: 55 BPM

## 2023-08-16 VITALS
DIASTOLIC BLOOD PRESSURE: 81 MMHG | BODY MASS INDEX: 33.2 KG/M2 | OXYGEN SATURATION: 97 % | HEIGHT: 72 IN | WEIGHT: 245.1 LBS | TEMPERATURE: 97.7 F | SYSTOLIC BLOOD PRESSURE: 117 MMHG | HEART RATE: 61 BPM

## 2023-08-16 DIAGNOSIS — D50.0 IRON DEFICIENCY ANEMIA DUE TO CHRONIC BLOOD LOSS: ICD-10-CM

## 2023-08-16 DIAGNOSIS — D64.9 ANEMIA, UNSPECIFIED TYPE: ICD-10-CM

## 2023-08-16 DIAGNOSIS — D50.0 IRON DEFICIENCY ANEMIA DUE TO CHRONIC BLOOD LOSS: Primary | ICD-10-CM

## 2023-08-16 DIAGNOSIS — Z86.2 H/O: IRON DEFICIENCY ANEMIA: ICD-10-CM

## 2023-08-16 LAB
ALBUMIN SERPL-MCNC: 4 G/DL (ref 3.5–5.2)
ALP SERPL-CCNC: 74 U/L (ref 40–129)
ALT SERPL-CCNC: 26 U/L (ref 0–40)
ANION GAP SERPL CALCULATED.3IONS-SCNC: 10 MMOL/L (ref 7–16)
AST SERPL-CCNC: 25 U/L (ref 0–39)
BASOPHILS # BLD: 0.02 K/UL (ref 0–0.2)
BASOPHILS NFR BLD: 0 % (ref 0–2)
BILIRUB SERPL-MCNC: 0.9 MG/DL (ref 0–1.2)
BUN SERPL-MCNC: 14 MG/DL (ref 6–23)
CALCIUM SERPL-MCNC: 9.3 MG/DL (ref 8.6–10.2)
CHLORIDE SERPL-SCNC: 107 MMOL/L (ref 98–107)
CO2 SERPL-SCNC: 24 MMOL/L (ref 22–29)
CREAT SERPL-MCNC: 1.1 MG/DL (ref 0.7–1.2)
EOSINOPHIL # BLD: 0.11 K/UL (ref 0.05–0.5)
EOSINOPHILS RELATIVE PERCENT: 2 % (ref 0–6)
ERYTHROCYTE [DISTWIDTH] IN BLOOD BY AUTOMATED COUNT: 19.8 % (ref 11.5–15)
FERRITIN SERPL-MCNC: 275 NG/ML
GFR SERPL CREATININE-BSD FRML MDRD: >60 ML/MIN/1.73M2
GLUCOSE SERPL-MCNC: 104 MG/DL (ref 74–99)
HCT VFR BLD AUTO: 38.5 % (ref 37–54)
HGB BLD-MCNC: 12.8 G/DL (ref 12.5–16.5)
IMM GRANULOCYTES # BLD AUTO: 0.03 K/UL (ref 0–0.58)
IMM GRANULOCYTES NFR BLD: 1 % (ref 0–5)
LYMPHOCYTES NFR BLD: 0.94 K/UL (ref 1.5–4)
LYMPHOCYTES RELATIVE PERCENT: 21 % (ref 20–42)
MCH RBC QN AUTO: 26.6 PG (ref 26–35)
MCHC RBC AUTO-ENTMCNC: 33.2 G/DL (ref 32–34.5)
MCV RBC AUTO: 79.9 FL (ref 80–99.9)
MONOCYTES NFR BLD: 0.48 K/UL (ref 0.1–0.95)
MONOCYTES NFR BLD: 11 % (ref 2–12)
NEUTROPHILS NFR BLD: 65 % (ref 43–80)
NEUTS SEG NFR BLD: 2.95 K/UL (ref 1.8–7.3)
PLATELET # BLD AUTO: 230 K/UL (ref 130–450)
PMV BLD AUTO: 9.2 FL (ref 7–12)
POTASSIUM SERPL-SCNC: 4.2 MMOL/L (ref 3.5–5)
PROT SERPL-MCNC: 6.8 G/DL (ref 6.4–8.3)
RBC # BLD AUTO: 4.82 M/UL (ref 3.8–5.8)
SODIUM SERPL-SCNC: 141 MMOL/L (ref 132–146)
WBC OTHER # BLD: 4.5 K/UL (ref 4.5–11.5)

## 2023-08-16 PROCEDURE — 6360000002 HC RX W HCPCS: Performed by: NURSE PRACTITIONER

## 2023-08-16 PROCEDURE — 82728 ASSAY OF FERRITIN: CPT

## 2023-08-16 PROCEDURE — 96365 THER/PROPH/DIAG IV INF INIT: CPT

## 2023-08-16 PROCEDURE — 83540 ASSAY OF IRON: CPT

## 2023-08-16 PROCEDURE — 80053 COMPREHEN METABOLIC PANEL: CPT

## 2023-08-16 PROCEDURE — 83550 IRON BINDING TEST: CPT

## 2023-08-16 PROCEDURE — 85025 COMPLETE CBC W/AUTO DIFF WBC: CPT

## 2023-08-16 PROCEDURE — 2580000003 HC RX 258: Performed by: NURSE PRACTITIONER

## 2023-08-16 PROCEDURE — 36415 COLL VENOUS BLD VENIPUNCTURE: CPT

## 2023-08-16 RX ORDER — HEPARIN 100 UNIT/ML
500 SYRINGE INTRAVENOUS PRN
OUTPATIENT
Start: 2023-08-23

## 2023-08-16 RX ORDER — ACETAMINOPHEN 325 MG/1
650 TABLET ORAL
OUTPATIENT
Start: 2023-08-23

## 2023-08-16 RX ORDER — SODIUM CHLORIDE 9 MG/ML
5-250 INJECTION, SOLUTION INTRAVENOUS PRN
OUTPATIENT
Start: 2023-08-23

## 2023-08-16 RX ORDER — SODIUM CHLORIDE 9 MG/ML
5-250 INJECTION, SOLUTION INTRAVENOUS PRN
Status: CANCELLED | OUTPATIENT
Start: 2023-08-16

## 2023-08-16 RX ORDER — EPINEPHRINE 1 MG/ML
0.3 INJECTION, SOLUTION, CONCENTRATE INTRAVENOUS PRN
OUTPATIENT
Start: 2023-08-23

## 2023-08-16 RX ORDER — SODIUM CHLORIDE 9 MG/ML
INJECTION, SOLUTION INTRAVENOUS CONTINUOUS
OUTPATIENT
Start: 2023-08-23

## 2023-08-16 RX ORDER — FAMOTIDINE 10 MG/ML
20 INJECTION, SOLUTION INTRAVENOUS
OUTPATIENT
Start: 2023-08-23

## 2023-08-16 RX ORDER — ONDANSETRON 2 MG/ML
8 INJECTION INTRAMUSCULAR; INTRAVENOUS
OUTPATIENT
Start: 2023-08-23

## 2023-08-16 RX ORDER — SODIUM CHLORIDE 9 MG/ML
5-250 INJECTION, SOLUTION INTRAVENOUS PRN
Status: DISCONTINUED | OUTPATIENT
Start: 2023-08-16 | End: 2023-08-17 | Stop reason: HOSPADM

## 2023-08-16 RX ORDER — ONDANSETRON 2 MG/ML
8 INJECTION INTRAMUSCULAR; INTRAVENOUS
Status: CANCELLED | OUTPATIENT
Start: 2023-08-16

## 2023-08-16 RX ORDER — ACETAMINOPHEN 325 MG/1
650 TABLET ORAL
Status: CANCELLED | OUTPATIENT
Start: 2023-08-16

## 2023-08-16 RX ORDER — FAMOTIDINE 10 MG/ML
20 INJECTION, SOLUTION INTRAVENOUS
Status: CANCELLED | OUTPATIENT
Start: 2023-08-16

## 2023-08-16 RX ORDER — DIPHENHYDRAMINE HYDROCHLORIDE 50 MG/ML
50 INJECTION INTRAMUSCULAR; INTRAVENOUS
OUTPATIENT
Start: 2023-08-23

## 2023-08-16 RX ORDER — SODIUM CHLORIDE 9 MG/ML
INJECTION, SOLUTION INTRAVENOUS CONTINUOUS
Status: CANCELLED | OUTPATIENT
Start: 2023-08-16

## 2023-08-16 RX ORDER — EPINEPHRINE 1 MG/ML
0.3 INJECTION, SOLUTION, CONCENTRATE INTRAVENOUS PRN
Status: CANCELLED | OUTPATIENT
Start: 2023-08-16

## 2023-08-16 RX ORDER — DIPHENHYDRAMINE HYDROCHLORIDE 50 MG/ML
50 INJECTION INTRAMUSCULAR; INTRAVENOUS
Status: CANCELLED | OUTPATIENT
Start: 2023-08-16

## 2023-08-16 RX ORDER — ALBUTEROL SULFATE 90 UG/1
4 AEROSOL, METERED RESPIRATORY (INHALATION) PRN
Status: CANCELLED | OUTPATIENT
Start: 2023-08-16

## 2023-08-16 RX ORDER — ALBUTEROL SULFATE 90 UG/1
4 AEROSOL, METERED RESPIRATORY (INHALATION) PRN
OUTPATIENT
Start: 2023-08-23

## 2023-08-16 RX ORDER — HEPARIN 100 UNIT/ML
500 SYRINGE INTRAVENOUS PRN
Status: CANCELLED | OUTPATIENT
Start: 2023-08-16

## 2023-08-16 RX ADMIN — SODIUM CHLORIDE 50 ML/HR: 9 INJECTION, SOLUTION INTRAVENOUS at 16:00

## 2023-08-16 RX ADMIN — FERUMOXYTOL 510 MG: 510 INJECTION INTRAVENOUS at 16:01

## 2023-08-18 LAB
IRON SATN MFR SERPL: 25 % (ref 20–55)
IRON SERPL-MCNC: 62 UG/DL (ref 59–158)
TIBC SERPL-MCNC: 253 UG/DL (ref 250–450)

## 2023-08-18 RX ORDER — HYDROCORTISONE ACETATE 25 MG/1
25 SUPPOSITORY RECTAL EVERY 12 HOURS
Qty: 10 SUPPOSITORY | Refills: 1 | Status: SHIPPED | OUTPATIENT
Start: 2023-08-18

## 2023-08-20 LAB
FERRITIN: NORMAL
HCT, EXTERNAL: NORMAL
HGB, EXTERNAL: NORMAL
MCH, EXTERNAL: NORMAL
MCHC, EXTERNAL: NORMAL
MCV, EXTERNAL: NORMAL
MPV, EXTERNAL: NORMAL
NEUTROPHILS, EXTERNAL: NORMAL
PLATELETS, EXTERNAL: NORMAL
RBC, EXTERNAL: NORMAL
RDW, EXTERNAL: NORMAL
TOTAL IRON BINDING CAPACITY: NORMAL
WBC, EXTERNAL: NORMAL

## 2023-12-11 ENCOUNTER — HOSPITAL ENCOUNTER (OUTPATIENT)
Dept: INFUSION THERAPY | Age: 68
Discharge: HOME | End: 2023-12-11
Payer: MEDICARE

## 2023-12-11 DIAGNOSIS — D50.0 IRON DEFICIENCY ANEMIA DUE TO CHRONIC BLOOD LOSS: ICD-10-CM

## 2023-12-11 LAB
ALBUMIN SERPL-MCNC: 3.9 G/DL (ref 3.5–5.2)
ALP SERPL-CCNC: 82 U/L (ref 40–129)
ALT SERPL-CCNC: 27 U/L (ref 0–40)
ANION GAP SERPL CALCULATED.3IONS-SCNC: 9 MMOL/L (ref 7–16)
AST SERPL-CCNC: 16 U/L (ref 0–39)
BASOPHILS # BLD: 0.05 K/UL (ref 0–0.2)
BASOPHILS NFR BLD: 0 % (ref 0–2)
BILIRUB SERPL-MCNC: 0.8 MG/DL (ref 0–1.2)
BUN SERPL-MCNC: 19 MG/DL (ref 6–23)
CALCIUM SERPL-MCNC: 8.8 MG/DL (ref 8.6–10.2)
CHLORIDE SERPL-SCNC: 102 MMOL/L (ref 98–107)
CO2 SERPL-SCNC: 27 MMOL/L (ref 22–29)
CREAT SERPL-MCNC: 0.9 MG/DL (ref 0.7–1.2)
EOSINOPHIL # BLD: 0.03 K/UL (ref 0.05–0.5)
EOSINOPHILS RELATIVE PERCENT: 0 % (ref 0–6)
ERYTHROCYTE [DISTWIDTH] IN BLOOD BY AUTOMATED COUNT: 13.6 % (ref 11.5–15)
FERRITIN SERPL-MCNC: 192 NG/ML
GFR SERPL CREATININE-BSD FRML MDRD: >60 ML/MIN/1.73M2
GLUCOSE SERPL-MCNC: 94 MG/DL (ref 74–99)
HCT VFR BLD AUTO: 42.3 % (ref 37–54)
HGB BLD-MCNC: 14.5 G/DL (ref 12.5–16.5)
IMM GRANULOCYTES # BLD AUTO: 0.23 K/UL (ref 0–0.58)
IMM GRANULOCYTES NFR BLD: 2 % (ref 0–5)
IRON SATN MFR SERPL: 47 % (ref 20–55)
IRON SERPL-MCNC: 130 UG/DL (ref 59–158)
LYMPHOCYTES NFR BLD: 1.18 K/UL (ref 1.5–4)
LYMPHOCYTES RELATIVE PERCENT: 10 % (ref 20–42)
MCH RBC QN AUTO: 30.8 PG (ref 26–35)
MCHC RBC AUTO-ENTMCNC: 34.3 G/DL (ref 32–34.5)
MCV RBC AUTO: 89.8 FL (ref 80–99.9)
MONOCYTES NFR BLD: 0.9 K/UL (ref 0.1–0.95)
MONOCYTES NFR BLD: 8 % (ref 2–12)
NEUTROPHILS NFR BLD: 79 % (ref 43–80)
NEUTS SEG NFR BLD: 9.06 K/UL (ref 1.8–7.3)
PLATELET # BLD AUTO: 355 K/UL (ref 130–450)
PMV BLD AUTO: 8.8 FL (ref 7–12)
POTASSIUM SERPL-SCNC: 4 MMOL/L (ref 3.5–5)
PROT SERPL-MCNC: 6.7 G/DL (ref 6.4–8.3)
RBC # BLD AUTO: 4.71 M/UL (ref 3.8–5.8)
SODIUM SERPL-SCNC: 138 MMOL/L (ref 132–146)
TIBC SERPL-MCNC: 275 UG/DL (ref 250–450)
WBC OTHER # BLD: 11.5 K/UL (ref 4.5–11.5)

## 2023-12-11 PROCEDURE — 83550 IRON BINDING TEST: CPT

## 2023-12-11 PROCEDURE — 83540 ASSAY OF IRON: CPT

## 2023-12-11 PROCEDURE — 80053 COMPREHEN METABOLIC PANEL: CPT

## 2023-12-11 PROCEDURE — 85025 COMPLETE CBC W/AUTO DIFF WBC: CPT

## 2023-12-11 PROCEDURE — 36415 COLL VENOUS BLD VENIPUNCTURE: CPT

## 2023-12-11 PROCEDURE — 82728 ASSAY OF FERRITIN: CPT

## 2023-12-11 NOTE — PROGRESS NOTES
Labs drawn peripherally and dry dressing applied. Specimen sent to lab. General : (-) Fever, (+) weight loss 10 lbs in 2 weeks, (+) chills   Neuro / ENT : (-) Headache, (-) vision changes, (-) dysphonia, (-) stridor   Pulmonary : (+) Mild dyspnea, (-) Cough, (-) sputum production, (-) wheezing   Cardiovascular : (-) Chest pain, (-) palpitations   GI : (-) Nausea / vomiting, (-) Abdominal pain, (-) diarrhea    : (-) Dysuria, (-) changes in voiding pattern, (-) Hematuria

## 2023-12-13 ENCOUNTER — OFFICE VISIT (OUTPATIENT)
Dept: ONCOLOGY | Age: 68
End: 2023-12-13
Payer: MEDICARE

## 2023-12-13 VITALS
HEART RATE: 94 BPM | DIASTOLIC BLOOD PRESSURE: 88 MMHG | SYSTOLIC BLOOD PRESSURE: 139 MMHG | OXYGEN SATURATION: 99 % | TEMPERATURE: 97.9 F | HEIGHT: 72 IN | WEIGHT: 249.8 LBS | BODY MASS INDEX: 33.83 KG/M2

## 2023-12-13 DIAGNOSIS — D50.0 IRON DEFICIENCY ANEMIA DUE TO CHRONIC BLOOD LOSS: Primary | ICD-10-CM

## 2023-12-13 PROCEDURE — 99212 OFFICE O/P EST SF 10 MIN: CPT

## 2023-12-13 RX ORDER — METHYLPREDNISOLONE 4 MG/1
TABLET ORAL
COMMUNITY
Start: 2023-12-06

## 2023-12-13 RX ORDER — CEFDINIR 300 MG/1
CAPSULE ORAL
COMMUNITY
Start: 2023-12-06

## 2023-12-13 RX ORDER — ALBUTEROL SULFATE 90 UG/1
AEROSOL, METERED RESPIRATORY (INHALATION)
COMMUNITY
Start: 2023-12-06

## 2024-04-16 ENCOUNTER — HOSPITAL ENCOUNTER (OUTPATIENT)
Dept: INFUSION THERAPY | Age: 69
Discharge: HOME | End: 2024-04-16
Payer: MEDICARE

## 2024-04-16 ENCOUNTER — HOSPITAL ENCOUNTER (OUTPATIENT)
Dept: INFUSION THERAPY | Age: 69
End: 2024-04-16

## 2024-04-16 DIAGNOSIS — D50.0 IRON DEFICIENCY ANEMIA DUE TO CHRONIC BLOOD LOSS: ICD-10-CM

## 2024-04-16 LAB
ALBUMIN SERPL-MCNC: 3.9 G/DL (ref 3.5–5.2)
ALP SERPL-CCNC: 63 U/L (ref 40–129)
ALT SERPL-CCNC: 22 U/L (ref 0–40)
ANION GAP SERPL CALCULATED.3IONS-SCNC: 9 MMOL/L (ref 7–16)
AST SERPL-CCNC: 25 U/L (ref 0–39)
BASOPHILS # BLD: 0.02 K/UL (ref 0–0.2)
BASOPHILS NFR BLD: 1 % (ref 0–2)
BILIRUB SERPL-MCNC: 0.7 MG/DL (ref 0–1.2)
BUN SERPL-MCNC: 13 MG/DL (ref 6–23)
CALCIUM SERPL-MCNC: 8.7 MG/DL (ref 8.6–10.2)
CHLORIDE SERPL-SCNC: 107 MMOL/L (ref 98–107)
CO2 SERPL-SCNC: 25 MMOL/L (ref 22–29)
CREAT SERPL-MCNC: 1 MG/DL (ref 0.7–1.2)
EOSINOPHIL # BLD: 0.1 K/UL (ref 0.05–0.5)
EOSINOPHILS RELATIVE PERCENT: 3 % (ref 0–6)
ERYTHROCYTE [DISTWIDTH] IN BLOOD BY AUTOMATED COUNT: 13.1 % (ref 11.5–15)
FERRITIN SERPL-MCNC: 39 NG/ML
GFR SERPL CREATININE-BSD FRML MDRD: 80 ML/MIN/1.73M2
GLUCOSE SERPL-MCNC: 97 MG/DL (ref 74–99)
HCT VFR BLD AUTO: 37 % (ref 37–54)
HGB BLD-MCNC: 12.9 G/DL (ref 12.5–16.5)
IMM GRANULOCYTES # BLD AUTO: <0.03 K/UL (ref 0–0.58)
IMM GRANULOCYTES NFR BLD: 0 % (ref 0–5)
IRON SATN MFR SERPL: 16 % (ref 20–55)
IRON SERPL-MCNC: 48 UG/DL (ref 59–158)
LYMPHOCYTES NFR BLD: 0.98 K/UL (ref 1.5–4)
LYMPHOCYTES RELATIVE PERCENT: 31 % (ref 20–42)
MCH RBC QN AUTO: 30.9 PG (ref 26–35)
MCHC RBC AUTO-ENTMCNC: 34.9 G/DL (ref 32–34.5)
MCV RBC AUTO: 88.5 FL (ref 80–99.9)
MONOCYTES NFR BLD: 0.43 K/UL (ref 0.1–0.95)
MONOCYTES NFR BLD: 14 % (ref 2–12)
NEUTROPHILS NFR BLD: 51 % (ref 43–80)
NEUTS SEG NFR BLD: 1.6 K/UL (ref 1.8–7.3)
PLATELET # BLD AUTO: 254 K/UL (ref 130–450)
PMV BLD AUTO: 9 FL (ref 7–12)
POTASSIUM SERPL-SCNC: 4.2 MMOL/L (ref 3.5–5)
PROT SERPL-MCNC: 6.7 G/DL (ref 6.4–8.3)
RBC # BLD AUTO: 4.18 M/UL (ref 3.8–5.8)
SODIUM SERPL-SCNC: 141 MMOL/L (ref 132–146)
TIBC SERPL-MCNC: 294 UG/DL (ref 250–450)
WBC OTHER # BLD: 3.1 K/UL (ref 4.5–11.5)

## 2024-04-16 PROCEDURE — 80053 COMPREHEN METABOLIC PANEL: CPT

## 2024-04-16 PROCEDURE — 85025 COMPLETE CBC W/AUTO DIFF WBC: CPT

## 2024-04-16 PROCEDURE — 83540 ASSAY OF IRON: CPT

## 2024-04-16 PROCEDURE — 36415 COLL VENOUS BLD VENIPUNCTURE: CPT

## 2024-04-16 PROCEDURE — 83550 IRON BINDING TEST: CPT

## 2024-04-16 PROCEDURE — 82728 ASSAY OF FERRITIN: CPT

## 2024-04-17 ENCOUNTER — OFFICE VISIT (OUTPATIENT)
Dept: ONCOLOGY | Age: 69
End: 2024-04-17
Payer: MEDICARE

## 2024-04-17 VITALS
TEMPERATURE: 98 F | DIASTOLIC BLOOD PRESSURE: 83 MMHG | RESPIRATION RATE: 18 BRPM | WEIGHT: 272.3 LBS | SYSTOLIC BLOOD PRESSURE: 131 MMHG | HEART RATE: 58 BPM | OXYGEN SATURATION: 97 % | BODY MASS INDEX: 36.93 KG/M2

## 2024-04-17 DIAGNOSIS — D50.0 IRON DEFICIENCY ANEMIA DUE TO CHRONIC BLOOD LOSS: Primary | ICD-10-CM

## 2024-04-17 PROCEDURE — 99212 OFFICE O/P EST SF 10 MIN: CPT

## 2024-04-17 RX ORDER — SODIUM CHLORIDE 9 MG/ML
5-250 INJECTION, SOLUTION INTRAVENOUS PRN
Status: CANCELLED | OUTPATIENT
Start: 2024-04-22

## 2024-04-17 RX ORDER — EPINEPHRINE 1 MG/ML
0.3 INJECTION, SOLUTION, CONCENTRATE INTRAVENOUS PRN
Status: CANCELLED | OUTPATIENT
Start: 2024-04-22

## 2024-04-17 RX ORDER — ALBUTEROL SULFATE 90 UG/1
4 AEROSOL, METERED RESPIRATORY (INHALATION) PRN
Status: CANCELLED | OUTPATIENT
Start: 2024-04-22

## 2024-04-17 RX ORDER — SODIUM CHLORIDE 9 MG/ML
INJECTION, SOLUTION INTRAVENOUS CONTINUOUS
Status: CANCELLED | OUTPATIENT
Start: 2024-04-22

## 2024-04-17 RX ORDER — HEPARIN 100 UNIT/ML
500 SYRINGE INTRAVENOUS PRN
Status: CANCELLED | OUTPATIENT
Start: 2024-04-22

## 2024-04-17 RX ORDER — ONDANSETRON 2 MG/ML
8 INJECTION INTRAMUSCULAR; INTRAVENOUS
Status: CANCELLED | OUTPATIENT
Start: 2024-04-22

## 2024-04-17 RX ORDER — FAMOTIDINE 10 MG/ML
20 INJECTION, SOLUTION INTRAVENOUS
Status: CANCELLED | OUTPATIENT
Start: 2024-04-22

## 2024-04-17 RX ORDER — ACETAMINOPHEN 325 MG/1
650 TABLET ORAL
Status: CANCELLED | OUTPATIENT
Start: 2024-04-22

## 2024-04-17 RX ORDER — SODIUM CHLORIDE 0.9 % (FLUSH) 0.9 %
5-40 SYRINGE (ML) INJECTION PRN
Status: CANCELLED | OUTPATIENT
Start: 2024-04-22

## 2024-04-17 RX ORDER — DIPHENHYDRAMINE HYDROCHLORIDE 50 MG/ML
50 INJECTION INTRAMUSCULAR; INTRAVENOUS
Status: CANCELLED | OUTPATIENT
Start: 2024-04-22

## 2024-04-17 NOTE — PROGRESS NOTES
04/16/2024    CO2 25 04/16/2024    BUN 13 04/16/2024    CREATININE 1.0 04/16/2024    GLUCOSE 97 04/16/2024    CALCIUM 8.7 04/16/2024    PROT 6.7 04/16/2024    BILITOT 0.7 04/16/2024    ALKPHOS 63 04/16/2024    AST 25 04/16/2024    ALT 22 04/16/2024    LABGLOM 80 04/16/2024    GFRAA >60 07/20/2022         Lab Results   Component Value Date    IRON 48 (L) 04/16/2024    TIBC 294 04/16/2024    FERRITIN 39 04/16/2024           Radiology-  CT ABDOMEN AND PELVIS:  3/09/23  IMPRESSION:  Colonic diverticulosis with no evidence of acute diverticulitis.     Suboptimally distended urinary bladder with mild thickening and subtle  stranding of the surrounding fat, suspicious for cystitis.  Correlation with  urinalysis recommended.     Enlarged prostate gland, no change.     RECOMMENDATIONS:  Urinalysis.        ASSESSMENT/PLAN :  A 67-year-old man with prior history of iron deficiency anemia had been previously followed by Dr. Urena.      His follow up CBC in February 2015 showed a sudden acute drop in his hemoglobin to 9.1 . He has been on oral iron supplements 325 mg by mouth 3 times a day and has been intolerant with dyspepsia constipation and dark stools . Apparently in November he had a colonoscopy by Dr. Fonseca and benign polyps were removed and there was no active bleeding he also had a capsule enteroscopy and per patient it was negative. His last EGD in 2014 had shown healing of his gastric ulcer.      Back in JAN 2015 he had bronchitis and flu like symptoms and took steroids and antibiotics.  He responded to IV Iron and his Hb went up to normal range. His hemoglobin remains stable and in the normal range  He did have few months ago a repeat EGD by Dr. Ramirez and results will be retrieved. He was recommended to continue on ranitidine and pantoprazole  It was explained to him that his oral iron absorption will be impaired due to the fact that he is on PPI with lack of stomach acidity.  Should his hemoglobin dropped again

## 2024-04-22 ENCOUNTER — HOSPITAL ENCOUNTER (OUTPATIENT)
Dept: INFUSION THERAPY | Age: 69
Discharge: HOME | End: 2024-04-22
Payer: MEDICARE

## 2024-04-22 VITALS
RESPIRATION RATE: 16 BRPM | DIASTOLIC BLOOD PRESSURE: 74 MMHG | HEART RATE: 61 BPM | OXYGEN SATURATION: 95 % | SYSTOLIC BLOOD PRESSURE: 126 MMHG | TEMPERATURE: 98.1 F

## 2024-04-22 DIAGNOSIS — D50.0 IRON DEFICIENCY ANEMIA DUE TO CHRONIC BLOOD LOSS: ICD-10-CM

## 2024-04-22 DIAGNOSIS — Z86.2 H/O: IRON DEFICIENCY ANEMIA: ICD-10-CM

## 2024-04-22 DIAGNOSIS — D64.9 ANEMIA, UNSPECIFIED TYPE: Primary | ICD-10-CM

## 2024-04-22 PROCEDURE — 6360000002 HC RX W HCPCS: Performed by: INTERNAL MEDICINE

## 2024-04-22 PROCEDURE — 96365 THER/PROPH/DIAG IV INF INIT: CPT

## 2024-04-22 PROCEDURE — 2580000003 HC RX 258: Performed by: INTERNAL MEDICINE

## 2024-04-22 RX ORDER — HEPARIN 100 UNIT/ML
500 SYRINGE INTRAVENOUS PRN
Status: CANCELLED | OUTPATIENT
Start: 2024-04-29

## 2024-04-22 RX ORDER — ONDANSETRON 2 MG/ML
8 INJECTION INTRAMUSCULAR; INTRAVENOUS
Status: CANCELLED | OUTPATIENT
Start: 2024-04-29

## 2024-04-22 RX ORDER — FAMOTIDINE 10 MG/ML
20 INJECTION, SOLUTION INTRAVENOUS
Status: CANCELLED | OUTPATIENT
Start: 2024-04-29

## 2024-04-22 RX ORDER — DIPHENHYDRAMINE HYDROCHLORIDE 50 MG/ML
50 INJECTION INTRAMUSCULAR; INTRAVENOUS
Status: CANCELLED | OUTPATIENT
Start: 2024-04-29

## 2024-04-22 RX ORDER — SODIUM CHLORIDE 9 MG/ML
5-250 INJECTION, SOLUTION INTRAVENOUS PRN
Status: CANCELLED | OUTPATIENT
Start: 2024-04-29

## 2024-04-22 RX ORDER — EPINEPHRINE 1 MG/ML
0.3 INJECTION, SOLUTION, CONCENTRATE INTRAVENOUS PRN
Status: CANCELLED | OUTPATIENT
Start: 2024-04-29

## 2024-04-22 RX ORDER — SODIUM CHLORIDE 9 MG/ML
5-250 INJECTION, SOLUTION INTRAVENOUS PRN
Status: DISCONTINUED | OUTPATIENT
Start: 2024-04-22 | End: 2024-04-23 | Stop reason: HOSPADM

## 2024-04-22 RX ORDER — SODIUM CHLORIDE 9 MG/ML
INJECTION, SOLUTION INTRAVENOUS CONTINUOUS
Status: CANCELLED | OUTPATIENT
Start: 2024-04-29

## 2024-04-22 RX ORDER — SODIUM CHLORIDE 0.9 % (FLUSH) 0.9 %
5-40 SYRINGE (ML) INJECTION PRN
Status: CANCELLED | OUTPATIENT
Start: 2024-04-29

## 2024-04-22 RX ORDER — SODIUM CHLORIDE 0.9 % (FLUSH) 0.9 %
5-40 SYRINGE (ML) INJECTION PRN
Status: DISCONTINUED | OUTPATIENT
Start: 2024-04-22 | End: 2024-04-23 | Stop reason: HOSPADM

## 2024-04-22 RX ORDER — ALBUTEROL SULFATE 90 UG/1
4 AEROSOL, METERED RESPIRATORY (INHALATION) PRN
Status: CANCELLED | OUTPATIENT
Start: 2024-04-29

## 2024-04-22 RX ORDER — ACETAMINOPHEN 325 MG/1
650 TABLET ORAL
Status: CANCELLED | OUTPATIENT
Start: 2024-04-29

## 2024-04-22 RX ADMIN — FERUMOXYTOL 510 MG: 510 INJECTION INTRAVENOUS at 08:44

## 2024-04-22 RX ADMIN — SODIUM CHLORIDE, PRESERVATIVE FREE 10 ML: 5 INJECTION INTRAVENOUS at 08:28

## 2024-04-22 RX ADMIN — SODIUM CHLORIDE 20 ML/HR: 9 INJECTION, SOLUTION INTRAVENOUS at 08:29

## 2024-04-29 ENCOUNTER — HOSPITAL ENCOUNTER (OUTPATIENT)
Dept: INFUSION THERAPY | Age: 69
Discharge: HOME | End: 2024-04-29
Payer: MEDICARE

## 2024-04-29 VITALS
TEMPERATURE: 98.3 F | OXYGEN SATURATION: 94 % | DIASTOLIC BLOOD PRESSURE: 87 MMHG | HEART RATE: 64 BPM | SYSTOLIC BLOOD PRESSURE: 134 MMHG | RESPIRATION RATE: 16 BRPM

## 2024-04-29 DIAGNOSIS — D64.9 ANEMIA, UNSPECIFIED TYPE: Primary | ICD-10-CM

## 2024-04-29 DIAGNOSIS — Z86.2 H/O: IRON DEFICIENCY ANEMIA: ICD-10-CM

## 2024-04-29 DIAGNOSIS — D50.0 IRON DEFICIENCY ANEMIA DUE TO CHRONIC BLOOD LOSS: ICD-10-CM

## 2024-04-29 PROCEDURE — 96365 THER/PROPH/DIAG IV INF INIT: CPT

## 2024-04-29 PROCEDURE — 2580000003 HC RX 258: Performed by: INTERNAL MEDICINE

## 2024-04-29 PROCEDURE — 6360000002 HC RX W HCPCS: Performed by: INTERNAL MEDICINE

## 2024-04-29 RX ORDER — ALBUTEROL SULFATE 90 UG/1
4 AEROSOL, METERED RESPIRATORY (INHALATION) PRN
Status: CANCELLED | OUTPATIENT
Start: 2024-04-29

## 2024-04-29 RX ORDER — ACETAMINOPHEN 325 MG/1
650 TABLET ORAL
Status: CANCELLED | OUTPATIENT
Start: 2024-04-29

## 2024-04-29 RX ORDER — FAMOTIDINE 10 MG/ML
20 INJECTION, SOLUTION INTRAVENOUS
OUTPATIENT
Start: 2024-04-29

## 2024-04-29 RX ORDER — HEPARIN 100 UNIT/ML
500 SYRINGE INTRAVENOUS PRN
Status: CANCELLED | OUTPATIENT
Start: 2024-04-29

## 2024-04-29 RX ORDER — DIPHENHYDRAMINE HYDROCHLORIDE 50 MG/ML
50 INJECTION INTRAMUSCULAR; INTRAVENOUS
Status: CANCELLED | OUTPATIENT
Start: 2024-04-29

## 2024-04-29 RX ORDER — SODIUM CHLORIDE 9 MG/ML
5-250 INJECTION, SOLUTION INTRAVENOUS PRN
OUTPATIENT
Start: 2024-04-29

## 2024-04-29 RX ORDER — ONDANSETRON 2 MG/ML
8 INJECTION INTRAMUSCULAR; INTRAVENOUS
Status: CANCELLED | OUTPATIENT
Start: 2024-04-29

## 2024-04-29 RX ORDER — DIPHENHYDRAMINE HYDROCHLORIDE 50 MG/ML
50 INJECTION INTRAMUSCULAR; INTRAVENOUS
OUTPATIENT
Start: 2024-04-29

## 2024-04-29 RX ORDER — FAMOTIDINE 10 MG/ML
INJECTION, SOLUTION INTRAVENOUS
Status: DISPENSED
Start: 2024-04-29 | End: 2024-04-29

## 2024-04-29 RX ORDER — ALBUTEROL SULFATE 90 UG/1
4 AEROSOL, METERED RESPIRATORY (INHALATION) PRN
OUTPATIENT
Start: 2024-04-29

## 2024-04-29 RX ORDER — SODIUM CHLORIDE 9 MG/ML
5-250 INJECTION, SOLUTION INTRAVENOUS PRN
Status: CANCELLED | OUTPATIENT
Start: 2024-04-29

## 2024-04-29 RX ORDER — SODIUM CHLORIDE 9 MG/ML
INJECTION, SOLUTION INTRAVENOUS CONTINUOUS
Status: CANCELLED | OUTPATIENT
Start: 2024-04-29

## 2024-04-29 RX ORDER — ONDANSETRON 2 MG/ML
8 INJECTION INTRAMUSCULAR; INTRAVENOUS
OUTPATIENT
Start: 2024-04-29

## 2024-04-29 RX ORDER — EPINEPHRINE 1 MG/ML
0.3 INJECTION, SOLUTION, CONCENTRATE INTRAVENOUS PRN
OUTPATIENT
Start: 2024-04-29

## 2024-04-29 RX ORDER — SODIUM CHLORIDE 9 MG/ML
5-250 INJECTION, SOLUTION INTRAVENOUS PRN
Status: DISCONTINUED | OUTPATIENT
Start: 2024-04-29 | End: 2024-04-29

## 2024-04-29 RX ORDER — EPINEPHRINE 1 MG/ML
0.3 INJECTION, SOLUTION, CONCENTRATE INTRAVENOUS PRN
Status: CANCELLED | OUTPATIENT
Start: 2024-04-29

## 2024-04-29 RX ORDER — DIPHENHYDRAMINE HYDROCHLORIDE 50 MG/ML
INJECTION INTRAMUSCULAR; INTRAVENOUS
Status: DISPENSED
Start: 2024-04-29 | End: 2024-04-29

## 2024-04-29 RX ORDER — SODIUM CHLORIDE 0.9 % (FLUSH) 0.9 %
5-40 SYRINGE (ML) INJECTION PRN
Status: CANCELLED | OUTPATIENT
Start: 2024-04-29

## 2024-04-29 RX ORDER — FAMOTIDINE 10 MG/ML
20 INJECTION, SOLUTION INTRAVENOUS
Status: CANCELLED | OUTPATIENT
Start: 2024-04-29

## 2024-04-29 RX ORDER — SODIUM CHLORIDE 0.9 % (FLUSH) 0.9 %
5-40 SYRINGE (ML) INJECTION PRN
OUTPATIENT
Start: 2024-04-29

## 2024-04-29 RX ORDER — SODIUM CHLORIDE 9 MG/ML
INJECTION, SOLUTION INTRAVENOUS CONTINUOUS
OUTPATIENT
Start: 2024-04-29

## 2024-04-29 RX ORDER — HEPARIN 100 UNIT/ML
500 SYRINGE INTRAVENOUS PRN
OUTPATIENT
Start: 2024-04-29

## 2024-04-29 RX ORDER — ACETAMINOPHEN 325 MG/1
650 TABLET ORAL
OUTPATIENT
Start: 2024-04-29

## 2024-04-29 RX ADMIN — FERUMOXYTOL 510 MG: 510 INJECTION INTRAVENOUS at 08:56

## 2024-04-29 RX ADMIN — SODIUM CHLORIDE 50 ML/HR: 9 INJECTION, SOLUTION INTRAVENOUS at 08:53

## 2024-05-20 ENCOUNTER — HOSPITAL ENCOUNTER (OUTPATIENT)
Dept: INFUSION THERAPY | Age: 69
Discharge: HOME | End: 2024-05-20
Payer: MEDICARE

## 2024-05-20 DIAGNOSIS — D50.0 IRON DEFICIENCY ANEMIA DUE TO CHRONIC BLOOD LOSS: ICD-10-CM

## 2024-05-20 LAB
ALBUMIN SERPL-MCNC: 4 G/DL (ref 3.5–5.2)
ALP SERPL-CCNC: 62 U/L (ref 40–129)
ALT SERPL-CCNC: 23 U/L (ref 0–40)
ANION GAP SERPL CALCULATED.3IONS-SCNC: 11 MMOL/L (ref 7–16)
AST SERPL-CCNC: 26 U/L (ref 0–39)
BASOPHILS # BLD: 0.02 K/UL (ref 0–0.2)
BASOPHILS NFR BLD: 1 % (ref 0–2)
BILIRUB SERPL-MCNC: 0.8 MG/DL (ref 0–1.2)
BUN SERPL-MCNC: 16 MG/DL (ref 6–23)
CALCIUM SERPL-MCNC: 8.9 MG/DL (ref 8.6–10.2)
CHLORIDE SERPL-SCNC: 106 MMOL/L (ref 98–107)
CO2 SERPL-SCNC: 23 MMOL/L (ref 22–29)
CREAT SERPL-MCNC: 1.1 MG/DL (ref 0.7–1.2)
EOSINOPHIL # BLD: 0.09 K/UL (ref 0.05–0.5)
EOSINOPHILS RELATIVE PERCENT: 2 % (ref 0–6)
ERYTHROCYTE [DISTWIDTH] IN BLOOD BY AUTOMATED COUNT: 13.2 % (ref 11.5–15)
FERRITIN SERPL-MCNC: 338 NG/ML
GFR, ESTIMATED: 72 ML/MIN/1.73M2
GLUCOSE SERPL-MCNC: 108 MG/DL (ref 74–99)
HCT VFR BLD AUTO: 40.4 % (ref 37–54)
HGB BLD-MCNC: 14.1 G/DL (ref 12.5–16.5)
IMM GRANULOCYTES # BLD AUTO: <0.03 K/UL (ref 0–0.58)
IMM GRANULOCYTES NFR BLD: 1 % (ref 0–5)
IRON SATN MFR SERPL: 29 % (ref 20–55)
IRON SERPL-MCNC: 74 UG/DL (ref 59–158)
LYMPHOCYTES NFR BLD: 1.07 K/UL (ref 1.5–4)
LYMPHOCYTES RELATIVE PERCENT: 26 % (ref 20–42)
MCH RBC QN AUTO: 30.7 PG (ref 26–35)
MCHC RBC AUTO-ENTMCNC: 34.9 G/DL (ref 32–34.5)
MCV RBC AUTO: 87.8 FL (ref 80–99.9)
MONOCYTES NFR BLD: 0.52 K/UL (ref 0.1–0.95)
MONOCYTES NFR BLD: 13 % (ref 2–12)
NEUTROPHILS NFR BLD: 58 % (ref 43–80)
NEUTS SEG NFR BLD: 2.35 K/UL (ref 1.8–7.3)
PLATELET # BLD AUTO: 227 K/UL (ref 130–450)
PMV BLD AUTO: 9.4 FL (ref 7–12)
POTASSIUM SERPL-SCNC: 4 MMOL/L (ref 3.5–5)
PROT SERPL-MCNC: 6.7 G/DL (ref 6.4–8.3)
RBC # BLD AUTO: 4.6 M/UL (ref 3.8–5.8)
SODIUM SERPL-SCNC: 140 MMOL/L (ref 132–146)
TIBC SERPL-MCNC: 252 UG/DL (ref 250–450)
WBC OTHER # BLD: 4.1 K/UL (ref 4.5–11.5)

## 2024-05-20 PROCEDURE — 83540 ASSAY OF IRON: CPT

## 2024-05-20 PROCEDURE — 36415 COLL VENOUS BLD VENIPUNCTURE: CPT

## 2024-05-20 PROCEDURE — 83550 IRON BINDING TEST: CPT

## 2024-05-20 PROCEDURE — 80053 COMPREHEN METABOLIC PANEL: CPT

## 2024-05-20 PROCEDURE — 85025 COMPLETE CBC W/AUTO DIFF WBC: CPT

## 2024-05-20 PROCEDURE — 82728 ASSAY OF FERRITIN: CPT

## 2024-05-22 ENCOUNTER — OFFICE VISIT (OUTPATIENT)
Dept: ONCOLOGY | Age: 69
End: 2024-05-22
Payer: MEDICARE

## 2024-05-22 VITALS
SYSTOLIC BLOOD PRESSURE: 148 MMHG | HEIGHT: 72 IN | BODY MASS INDEX: 35.27 KG/M2 | TEMPERATURE: 98.2 F | DIASTOLIC BLOOD PRESSURE: 91 MMHG | WEIGHT: 260.4 LBS | HEART RATE: 62 BPM | OXYGEN SATURATION: 98 %

## 2024-05-22 DIAGNOSIS — D50.0 IRON DEFICIENCY ANEMIA DUE TO CHRONIC BLOOD LOSS: Primary | ICD-10-CM

## 2024-05-22 PROCEDURE — 99212 OFFICE O/P EST SF 10 MIN: CPT

## 2024-05-22 NOTE — PROGRESS NOTES
history of NSAID related gastritis.  He has had recurrent iron deficiency and had benefited in the past from parenteral IV iron.  He is to return next week for IV Feraheme.  He has poor oral iron absorption the fact that he takes PPI.  Again it was stressed to him the importance of cutting down the intake of NSAIDs to prevent recurrent gastritis and potential for GI bleed.    8/16/23  Patient completed 2 doses of IV Feraheme in July.  He is feeling better.  He remains on pantoprazole for GERD and gastritis and he cut down his intake of NSAIDs.  Hemoglobin is improved to 12.8 and MCV is improved to 79  Iron studies are pending.  He is to receive 1 additional dose of parenteral IV iron with Feraheme to optimize his iron stores.    12/13/23  For patient is on antibiotics and steroids for recent upper respiratory tract infection with wheezing.  He continues on pantoprazole for GERD and gastritis.  He had required periodically parenteral IV iron in the past.  His hemoglobin today is improved and is up to normal at 14.  No therapy warranted.    4/17/2024  Patient had recovered from upper respiratory tract infection and he had completed antibiotics and a course of steroids.  However he has been more tired lately  His CMP is unremarkable.  His CBC shows moderate drop in hemoglobin from 14.5-12.9  His iron studies are again consistent with iron deficiency.  Patient with past history relevant for recurrent iron deficiency anemia in relation to GERD and gastritis with poor oral iron absorption the fact that he takes over-the-counter PPIs.  He is to be resumed on parenteral IV iron with Feraheme    5/22/24  Felt better following IV iron and he received 2 doses of Feraheme few weeks ago.  Hemoglobin nicely improved and is up to 14.1 with normalization of his iron studies  Patient with recurrent iron deficiency anemia in relation to GERD and gastritis with poor oral iron absorption the fact that he takes PPI.    He will return

## 2024-09-20 ENCOUNTER — OFFICE VISIT (OUTPATIENT)
Dept: SURGERY | Age: 69
End: 2024-09-20
Payer: MEDICARE

## 2024-09-20 ENCOUNTER — TELEPHONE (OUTPATIENT)
Dept: SURGERY | Age: 69
End: 2024-09-20

## 2024-09-20 VITALS — TEMPERATURE: 97.8 F | SYSTOLIC BLOOD PRESSURE: 124 MMHG | DIASTOLIC BLOOD PRESSURE: 75 MMHG | HEART RATE: 62 BPM

## 2024-09-20 DIAGNOSIS — D12.3 ADENOMATOUS POLYP OF TRANSVERSE COLON: Primary | ICD-10-CM

## 2024-09-20 PROBLEM — K63.5 COLON POLYP: Status: ACTIVE | Noted: 2024-09-20

## 2024-09-20 PROCEDURE — 1036F TOBACCO NON-USER: CPT | Performed by: SURGERY

## 2024-09-20 PROCEDURE — 3074F SYST BP LT 130 MM HG: CPT | Performed by: SURGERY

## 2024-09-20 PROCEDURE — 3078F DIAST BP <80 MM HG: CPT | Performed by: SURGERY

## 2024-09-20 PROCEDURE — G8417 CALC BMI ABV UP PARAM F/U: HCPCS | Performed by: SURGERY

## 2024-09-20 PROCEDURE — 1123F ACP DISCUSS/DSCN MKR DOCD: CPT | Performed by: SURGERY

## 2024-09-20 PROCEDURE — G8427 DOCREV CUR MEDS BY ELIG CLIN: HCPCS | Performed by: SURGERY

## 2024-09-20 PROCEDURE — 99214 OFFICE O/P EST MOD 30 MIN: CPT | Performed by: SURGERY

## 2024-09-20 PROCEDURE — 3017F COLORECTAL CA SCREEN DOC REV: CPT | Performed by: SURGERY

## 2024-09-20 RX ORDER — SODIUM CHLORIDE 9 MG/ML
INJECTION, SOLUTION INTRAVENOUS CONTINUOUS
OUTPATIENT
Start: 2024-09-20

## 2024-09-20 RX ORDER — SODIUM CHLORIDE 0.9 % (FLUSH) 0.9 %
5-40 SYRINGE (ML) INJECTION PRN
OUTPATIENT
Start: 2024-09-20

## 2024-09-20 RX ORDER — SODIUM CHLORIDE 0.9 % (FLUSH) 0.9 %
5-40 SYRINGE (ML) INJECTION EVERY 12 HOURS SCHEDULED
OUTPATIENT
Start: 2024-09-20

## 2024-09-20 RX ORDER — SODIUM CHLORIDE 9 MG/ML
INJECTION, SOLUTION INTRAVENOUS PRN
OUTPATIENT
Start: 2024-09-20

## 2024-10-14 NOTE — PROGRESS NOTES
[facility]                                                                                                                    PRE OP INSTRUCTIONS FOR  Tone Boykin        Date: 10/14/2024    Date of surgery: 10/15/24   Arrival Time: Hospital will call you between 5pm and 7pm with your final arrival time for surgery. Go to     front of hospital and check in at information desk.    Nothing by mouth (NPO) as instructed. May have clear liquids up to 2 hours prior to surgery. Nothing solid after midnight. Examples: water, apple juice, black coffee, plain tea    Take the following medications with a small sip of water on the morning of Surgery: metoprolol, protonix       Aspirin, Ibuprofen, Advil, Naproxen, other Anti-inflammatory products should be stopped before surgery as directed by your surgeon, cardiologist, or primary care Doctor. Herbal supplements and Vitamin E should be stopped five days prior.  May take Tylenol unless instructed otherwise by your surgeon.    Check with your Doctor regarding stopping Plavix, Coumadin, Lovenox, Eliquis, Effient, or other blood thinners such as, pradaxa, lixiana, xaralto and savaysa.    Do not smoke, vape, or use illicit drugs and do not drink any alcoholic beverages 24 hours prior to surgery.    You may brush your teeth the morning of surgery.      You MUST make arrangements for a responsible adult, 18 and over, to take you home after your surgery. You will not be allowed to leave alone or drive yourself home.  You will need someone stay with you the first 24 hrs. Your surgery will be cancelled if you do not have a ride home or someone to stay with you.      Please wear simple, loose fitting clothing to the hospital.  Do not bring valuables (money, credit cards, checkbooks, etc.) Do not wear any makeup (including no eye makeup) or nail polish on your fingers or toes.    DO NOT wear any jewelry or piercings on day of surgery.  All body piercings and

## 2024-10-15 ENCOUNTER — ANESTHESIA EVENT (OUTPATIENT)
Dept: ENDOSCOPY | Age: 69
End: 2024-10-15
Payer: MEDICARE

## 2024-10-15 ENCOUNTER — ANESTHESIA (OUTPATIENT)
Dept: ENDOSCOPY | Age: 69
End: 2024-10-15
Payer: MEDICARE

## 2024-10-15 ENCOUNTER — HOSPITAL ENCOUNTER (OUTPATIENT)
Age: 69
Setting detail: OUTPATIENT SURGERY
Discharge: HOME | End: 2024-10-15
Attending: SURGERY | Admitting: SURGERY
Payer: MEDICARE

## 2024-10-15 VITALS
HEART RATE: 68 BPM | WEIGHT: 234 LBS | BODY MASS INDEX: 31.69 KG/M2 | SYSTOLIC BLOOD PRESSURE: 155 MMHG | DIASTOLIC BLOOD PRESSURE: 89 MMHG | HEIGHT: 72 IN | RESPIRATION RATE: 15 BRPM | TEMPERATURE: 97.7 F | OXYGEN SATURATION: 96 %

## 2024-10-15 DIAGNOSIS — K63.5 COLON POLYP: ICD-10-CM

## 2024-10-15 PROCEDURE — 2709999900 HC NON-CHARGEABLE SUPPLY: Performed by: SURGERY

## 2024-10-15 PROCEDURE — 45380 COLONOSCOPY AND BIOPSY: CPT | Performed by: SURGERY

## 2024-10-15 PROCEDURE — 2580000003 HC RX 258: Performed by: SURGERY

## 2024-10-15 PROCEDURE — 7100000011 HC PHASE II RECOVERY - ADDTL 15 MIN: Performed by: SURGERY

## 2024-10-15 PROCEDURE — 6360000002 HC RX W HCPCS: Performed by: NURSE ANESTHETIST, CERTIFIED REGISTERED

## 2024-10-15 PROCEDURE — 3700000000 HC ANESTHESIA ATTENDED CARE: Performed by: SURGERY

## 2024-10-15 PROCEDURE — 3700000001 HC ADD 15 MINUTES (ANESTHESIA): Performed by: SURGERY

## 2024-10-15 PROCEDURE — 7100000010 HC PHASE II RECOVERY - FIRST 15 MIN: Performed by: SURGERY

## 2024-10-15 PROCEDURE — 3609010300 HC COLONOSCOPY W/BIOPSY SINGLE/MULTIPLE: Performed by: SURGERY

## 2024-10-15 PROCEDURE — 88305 TISSUE EXAM BY PATHOLOGIST: CPT

## 2024-10-15 RX ORDER — PROPOFOL 10 MG/ML
INJECTION, EMULSION INTRAVENOUS
Status: DISCONTINUED | OUTPATIENT
Start: 2024-10-15 | End: 2024-10-15 | Stop reason: SDUPTHER

## 2024-10-15 RX ORDER — SODIUM CHLORIDE 0.9 % (FLUSH) 0.9 %
5-40 SYRINGE (ML) INJECTION PRN
Status: DISCONTINUED | OUTPATIENT
Start: 2024-10-15 | End: 2024-10-15 | Stop reason: HOSPADM

## 2024-10-15 RX ORDER — SODIUM CHLORIDE 0.9 % (FLUSH) 0.9 %
5-40 SYRINGE (ML) INJECTION EVERY 12 HOURS SCHEDULED
Status: DISCONTINUED | OUTPATIENT
Start: 2024-10-15 | End: 2024-10-15 | Stop reason: HOSPADM

## 2024-10-15 RX ORDER — SODIUM CHLORIDE 9 MG/ML
INJECTION, SOLUTION INTRAVENOUS PRN
Status: DISCONTINUED | OUTPATIENT
Start: 2024-10-15 | End: 2024-10-15 | Stop reason: HOSPADM

## 2024-10-15 RX ORDER — SODIUM CHLORIDE 9 MG/ML
INJECTION, SOLUTION INTRAVENOUS CONTINUOUS
Status: DISCONTINUED | OUTPATIENT
Start: 2024-10-15 | End: 2024-10-15 | Stop reason: HOSPADM

## 2024-10-15 RX ADMIN — SODIUM CHLORIDE: 9 INJECTION, SOLUTION INTRAVENOUS at 07:42

## 2024-10-15 RX ADMIN — PROPOFOL 50 MG: 10 INJECTION, EMULSION INTRAVENOUS at 08:45

## 2024-10-15 RX ADMIN — PROPOFOL 100 MG: 10 INJECTION, EMULSION INTRAVENOUS at 08:33

## 2024-10-15 RX ADMIN — PROPOFOL 100 MG: 10 INJECTION, EMULSION INTRAVENOUS at 08:40

## 2024-10-15 RX ADMIN — PROPOFOL 50 MG: 10 INJECTION, EMULSION INTRAVENOUS at 08:38

## 2024-10-15 RX ADMIN — PROPOFOL 25 MG: 10 INJECTION, EMULSION INTRAVENOUS at 08:35

## 2024-10-15 ASSESSMENT — LIFESTYLE VARIABLES: SMOKING_STATUS: 0

## 2024-10-15 ASSESSMENT — PAIN - FUNCTIONAL ASSESSMENT
PAIN_FUNCTIONAL_ASSESSMENT: 0-10
PAIN_FUNCTIONAL_ASSESSMENT: NONE - DENIES PAIN
PAIN_FUNCTIONAL_ASSESSMENT: NONE - DENIES PAIN

## 2024-10-15 NOTE — ANESTHESIA POSTPROCEDURE EVALUATION
Department of Anesthesiology  Postprocedure Note    Patient: Tone Boykin  MRN: [id number]  YOB: 1955  Date of evaluation: 10/15/2024    Procedure Summary       Date: 10/15/24 Room / Location: [address]    Anesthesia Start: 0833 Anesthesia Stop: 0856    Procedure: COLONOSCOPY BIOPSY Diagnosis:       Colon polyp      (Colon polyp [K63.5])    Surgeons: Satinder Lentz MD Responsible Provider: Kip Marie MD    Anesthesia Type: MAC ASA Status: 3            Anesthesia Type: MAC    Ivory Phase I: Ivory Score: 10    Ivory Phase II:      Anesthesia Post Evaluation    Patient location during evaluation: PACU  Patient participation: complete - patient participated  Level of consciousness: awake and alert  Airway patency: patent  Nausea & Vomiting: no nausea and no vomiting  Cardiovascular status: hemodynamically stable  Respiratory status: acceptable  Hydration status: euvolemic  Pain management: satisfactory to patient    No notable events documented.

## 2024-10-15 NOTE — OP NOTE
Tone PAZ Lucretia  [facility]    Operative Report    DATE OF PROCEDURE: 10/15/2024    SURGEON: Dr. Satinder Lentz M.D.     Surgical Assistant: Audrey Asher RN     PREOPERATIVE DIAGNOSES:   Polyps  Hemorrhoids    POSTOPERATIVE DIAGNOSES:   10/15/2024 Colonoscopy showed several large, prolapsing hemorrhoids, very poor bowel prep, colon full of liquid stool, hepatic flexure 5 mm polyp biopsied.    OPERATION:   Colonoscopy to the cecum with biopsy removal hepatic flexure polyp    ANESTHESIA: LMAC.  BLOOD LOSS: none  SPECIMEN: right colon polyp    History and consent:  This is a 69 y.o. year old male with 4 mm ascending colon tubular adenoma polyp found in 2023, needs to have another colonoscopy.  I have discussed with the patient the indication, alternatives, and the possible risks and/or complications of the colonoscopy and the anesthesia. The patient appears to understand and agrees to proceed. Mag Citrate was given two days ago to start the bowels moving, then a Myralax bowel prep was done yesterday until the bowels were clear.    Monitoring and Safety:    Anaesthesia monitored vital signs, BP cuff, pulse oximetry, cardiac monitor and level of consciousness until recovered.     OPERATIONS:  The patient was placed on the table and sedated by anaesthesia. An anal exam was done, several large hemorrhoidal tags were present, with thrombosis and bleeding. The lubricated scope was passed into the rectum and retroflexed which showed several large hemorrhoids.  The scope was passed up through the sigmoid which was full of dark liquid stool which was irrigated and suctioned as much as possible. The scope was passed around to the hepatic flexure where a 5 mm polyp was found and several bites were taken with forceps. The scope was passed to the cecum.  The scope was slowly withdrawn, each area was examined again on the way out.  No other polyps were found.  The scope was removed. The patient  tolerated the procedure well.     Complications: none    Plan:  Keep the bowels soft and moving daily with fiber. Will need a repeat colonoscopy in 1 year with a better bowel prep. If the hemorrhoids keep bleeding, he will need PPH Hemorrhoidectomy.    Electronically signed Dr. Satinder Lentz Jr., M.D.

## 2024-10-15 NOTE — H&P
Yes Marquise Mendoza MD   Tarteddy Jenkins 1200 MG CAPS Take by mouth daily  Marquise Mendoza MD   tamsulosin (FLOMAX) 0.4 MG capsule   Marquise Mendoza MD   aspirin 81 MG tablet Take 1 tablet by mouth daily  Marquise Mendoza MD   Nutritional Supplements (ENSURE ACTIVE PO) Take by mouth  Marquise Mendoza MD   atorvastatin (LIPITOR) 40 MG tablet Take 1 tablet by mouth daily  John Willett MD   lisinopril (PRINIVIL;ZESTRIL) 5 MG tablet Take 1 tablet by mouth daily  Marquise Mendoza MD   clopidogrel (PLAVIX) 75 MG tablet TAKE ONE TABLET BY MOUTH EVERY DAY  Shlomo Peralta MD   Omega-3 Fatty Acids (OMEGA-3 FISH OIL PO) Take by mouth daily   Marquise Mendoza MD   colchicine 0.6 MG tablet Take 1 tablet by mouth as needed  Marquise Mendoza MD   Coenzyme Q10 (CO Q 10 PO) Take 100 mg by mouth daily.    Marquise Mendoza MD   nitroGLYCERIN (NITROSTAT) 0.4 MG SL tablet Place 1 tablet under the tongue as needed  Marquise Mendoza MD       Allergies: Patient has no known allergies.   Social History:   TOBACCO:   reports that he has quit smoking. He has never used smokeless tobacco.  All smokers must join the free smoking cessation program and stop smoking for 3 months before having any Bariatric surgery.  ETOH:    reports no history of alcohol use.       Problem Relation Age of Onset    Heart Disease Mother     Heart Disease Brother     Arrhythmia Maternal Grandmother        Review of Systems:  Psychiatric:  depression and anxiety  Respiratory: negative  Cardiovascular: negative  Gastrointestinal: negative  Musculoskeletal:negative  All others reviewed, negative    Physical Exam:   VITALS: Blood pressure (!) 142/84, pulse 65, temperature 98 °F (36.7 °C), temperature source Infrared, resp. rate 15, height 1.829 m (6'), weight 106.1 kg (234 lb), SpO2 97%.  General appearance: alert, appears stated age and cooperative, does ambulate easily  Head: Normocephalic, without obvious  abnormality, atraumatic  Eyes: PERRL  Ears/mouth/throat:  Ears clear, mouth normal, throat no redness  Neck: no adenopathy, no JVD, supple, symmetrical, trachea midline and thyroid not enlarged  Lungs: clear to auscultation bilaterally  Heart: regular rate and rhythm  Abdomen: soft, non-tender; bowel sounds normal; no masses,  no organomegaly  Extremities: extremities normal, atraumatic, no cyanosis or edema  Skin: no open wounds    Assessment:  Rectal bleeding stopped with Anusol suppositories  Ascending colon polyp: Tubular adenoma found in 2023.    Plan:  Colonoscopy     Physician Signature: Electronically signed by Dr. Satinder Lentz Jr., M.D.    Send copy to Virginia Sommer PA

## 2024-10-15 NOTE — DISCHARGE INSTRUCTIONS
Keep the bowels soft and moving daily with fiber. Will need a repeat colonoscopy in 1 year with a better bowel prep. If the hemorrhoids keep bleeding, he will need PPH Hemorrhoidectomy.

## 2024-10-17 LAB — SURGICAL PATHOLOGY REPORT: NORMAL

## 2024-10-25 ENCOUNTER — OFFICE VISIT (OUTPATIENT)
Dept: SURGERY | Age: 69
End: 2024-10-25
Payer: MEDICARE

## 2024-10-25 VITALS
TEMPERATURE: 98.2 F | DIASTOLIC BLOOD PRESSURE: 89 MMHG | HEART RATE: 62 BPM | WEIGHT: 234 LBS | SYSTOLIC BLOOD PRESSURE: 138 MMHG | HEIGHT: 72 IN | BODY MASS INDEX: 31.69 KG/M2

## 2024-10-25 DIAGNOSIS — K62.5 RECTAL BLEEDING: ICD-10-CM

## 2024-10-25 DIAGNOSIS — D12.3 ADENOMATOUS POLYP OF TRANSVERSE COLON: Primary | ICD-10-CM

## 2024-10-25 DIAGNOSIS — R10.13 EPIGASTRIC PAIN: ICD-10-CM

## 2024-10-25 PROCEDURE — 1159F MED LIST DOCD IN RCRD: CPT | Performed by: SURGERY

## 2024-10-25 PROCEDURE — 99213 OFFICE O/P EST LOW 20 MIN: CPT | Performed by: SURGERY

## 2024-10-25 PROCEDURE — 3017F COLORECTAL CA SCREEN DOC REV: CPT | Performed by: SURGERY

## 2024-10-25 PROCEDURE — G8484 FLU IMMUNIZE NO ADMIN: HCPCS | Performed by: SURGERY

## 2024-10-25 PROCEDURE — 1126F AMNT PAIN NOTED NONE PRSNT: CPT | Performed by: SURGERY

## 2024-10-25 PROCEDURE — 1036F TOBACCO NON-USER: CPT | Performed by: SURGERY

## 2024-10-25 PROCEDURE — 3079F DIAST BP 80-89 MM HG: CPT | Performed by: SURGERY

## 2024-10-25 PROCEDURE — 3075F SYST BP GE 130 - 139MM HG: CPT | Performed by: SURGERY

## 2024-10-25 PROCEDURE — G8417 CALC BMI ABV UP PARAM F/U: HCPCS | Performed by: SURGERY

## 2024-10-25 PROCEDURE — G8427 DOCREV CUR MEDS BY ELIG CLIN: HCPCS | Performed by: SURGERY

## 2024-10-25 PROCEDURE — 1123F ACP DISCUSS/DSCN MKR DOCD: CPT | Performed by: SURGERY

## 2024-10-25 RX ORDER — HYDROCORTISONE ACETATE 25 MG/1
25 SUPPOSITORY RECTAL EVERY 12 HOURS
Qty: 10 SUPPOSITORY | Refills: 1 | Status: SHIPPED | OUTPATIENT
Start: 2024-10-25

## 2024-10-25 NOTE — PROGRESS NOTES
Tone Boykin  10/25/2024  [facility] office visit    Chief Complaint: rectal bleeding,colon polyps    Tone Boykin is a 69 y.o., male 10/15/2024 Colonoscopy showed several large, prolapsing hemorrhoids, very poor bowel prep, colon full of liquid stool, hepatic flexure 5 mm polyp biopsied-path=Tubular adenoma .     History:  long history of occasional rectal bleeding, had hemorrhoid rubber banding many years ago. Has anemia and has had IV iron in the past. Takes Plavix for heart stents.   7/26/2023 Colonoscopy showed internal hemorrhoids, no active bleeding and a 4 mm ascending colon polyp.    Past Medical History:   Diagnosis Date    Anemia     CAD (coronary artery disease)     Gastric ulcer     Gout     Hyperlipidemia     Hypertension     Iron deficiency anemia due to chronic blood loss 06/17/2020    Varicose veins of calf 10/2024    being treated in [facility], dr. R. Mohseni     Past Surgical History:   Procedure Laterality Date    BAND HEMORRHOIDECTOMY      CARDIAC SURGERY  stent placement    COLONOSCOPY      COLONOSCOPY N/A 07/26/2023    COLONOSCOPY WITH BIOPSY performed by Satinder Lentz MD at [facility] ENDOSCOPY    COLONOSCOPY N/A 10/15/2024    COLONOSCOPY BIOPSY performed by Satinder Lentz MD at [facility] ENDOSCOPY    CORONARY ANGIOPLASTY WITH STENT PLACEMENT  2022    x5,[place] [facility], Dr.L. Chisholm    DIAGNOSTIC CARDIAC CATH LAB PROCEDURE  06/15/2007    [facility]: Severe two-vessel disease in LAD and LCX in left-dominant system. PCI to the LCX with BMS and LAD with PES     DIAGNOSTIC CARDIAC CATH LAB PROCEDURE  07/14/2008    [facility]: LM Normal. LAD mild disease with severe ostial lesion of diagonal branch. Mild LAD stent patent without instent restenosis, LCX with mild proximal disease and complete occlusion in middle segment, RCA nondominant with severe disease or proximal segment. Successful PCI of mid CX instent restenosis with ZES and proximal RCA ZES.

## 2024-11-26 ENCOUNTER — HOSPITAL ENCOUNTER (OUTPATIENT)
Dept: INFUSION THERAPY | Age: 69
Discharge: HOME | End: 2024-11-26
Payer: MEDICARE

## 2024-11-26 DIAGNOSIS — D50.0 IRON DEFICIENCY ANEMIA DUE TO CHRONIC BLOOD LOSS: ICD-10-CM

## 2024-11-26 LAB
ALBUMIN SERPL-MCNC: 4.3 G/DL (ref 3.5–5.2)
ALP SERPL-CCNC: 105 U/L (ref 40–129)
ALT SERPL-CCNC: 28 U/L (ref 0–40)
ANION GAP SERPL CALCULATED.3IONS-SCNC: 12 MMOL/L (ref 7–16)
AST SERPL-CCNC: 25 U/L (ref 0–39)
BASOPHILS # BLD: 0.03 K/UL (ref 0–0.2)
BASOPHILS NFR BLD: 1 % (ref 0–2)
BILIRUB SERPL-MCNC: 0.8 MG/DL (ref 0–1.2)
BUN SERPL-MCNC: 19 MG/DL (ref 6–23)
CALCIUM SERPL-MCNC: 9.6 MG/DL (ref 8.6–10.2)
CHLORIDE SERPL-SCNC: 102 MMOL/L (ref 98–107)
CO2 SERPL-SCNC: 25 MMOL/L (ref 22–29)
CREAT SERPL-MCNC: 1.1 MG/DL (ref 0.7–1.2)
EOSINOPHIL # BLD: 0.21 K/UL (ref 0.05–0.5)
EOSINOPHILS RELATIVE PERCENT: 3 % (ref 0–6)
ERYTHROCYTE [DISTWIDTH] IN BLOOD BY AUTOMATED COUNT: 12.7 % (ref 11.5–15)
FERRITIN SERPL-MCNC: 276 NG/ML
GFR, ESTIMATED: 75 ML/MIN/1.73M2
GLUCOSE SERPL-MCNC: 127 MG/DL (ref 74–99)
HCT VFR BLD AUTO: 44.7 % (ref 37–54)
HGB BLD-MCNC: 15.4 G/DL (ref 12.5–16.5)
IMM GRANULOCYTES # BLD AUTO: 0.03 K/UL (ref 0–0.58)
IMM GRANULOCYTES NFR BLD: 1 % (ref 0–5)
IRON SATN MFR SERPL: 25 % (ref 20–55)
IRON SERPL-MCNC: 68 UG/DL (ref 59–158)
LYMPHOCYTES NFR BLD: 1.26 K/UL (ref 1.5–4)
LYMPHOCYTES RELATIVE PERCENT: 19 % (ref 20–42)
MCH RBC QN AUTO: 30.8 PG (ref 26–35)
MCHC RBC AUTO-ENTMCNC: 34.5 G/DL (ref 32–34.5)
MCV RBC AUTO: 89.4 FL (ref 80–99.9)
MONOCYTES NFR BLD: 0.53 K/UL (ref 0.1–0.95)
MONOCYTES NFR BLD: 8 % (ref 2–12)
NEUTROPHILS NFR BLD: 69 % (ref 43–80)
NEUTS SEG NFR BLD: 4.52 K/UL (ref 1.8–7.3)
PLATELET # BLD AUTO: 279 K/UL (ref 130–450)
PMV BLD AUTO: 8.9 FL (ref 7–12)
POTASSIUM SERPL-SCNC: 4.3 MMOL/L (ref 3.5–5)
PROT SERPL-MCNC: 7.6 G/DL (ref 6.4–8.3)
RBC # BLD AUTO: 5 M/UL (ref 3.8–5.8)
SODIUM SERPL-SCNC: 139 MMOL/L (ref 132–146)
TIBC SERPL-MCNC: 274 UG/DL (ref 250–450)
WBC OTHER # BLD: 6.6 K/UL (ref 4.5–11.5)

## 2024-11-26 PROCEDURE — 36415 COLL VENOUS BLD VENIPUNCTURE: CPT

## 2024-11-26 PROCEDURE — 85025 COMPLETE CBC W/AUTO DIFF WBC: CPT

## 2024-11-26 PROCEDURE — 83550 IRON BINDING TEST: CPT

## 2024-11-26 PROCEDURE — 80053 COMPREHEN METABOLIC PANEL: CPT

## 2024-11-26 PROCEDURE — 83540 ASSAY OF IRON: CPT

## 2024-11-26 PROCEDURE — 82728 ASSAY OF FERRITIN: CPT

## 2024-11-27 ENCOUNTER — OFFICE VISIT (OUTPATIENT)
Dept: ONCOLOGY | Age: 69
End: 2024-11-27
Payer: MEDICARE

## 2024-11-27 VITALS
OXYGEN SATURATION: 97 % | HEIGHT: 72 IN | HEART RATE: 82 BPM | DIASTOLIC BLOOD PRESSURE: 92 MMHG | SYSTOLIC BLOOD PRESSURE: 139 MMHG | TEMPERATURE: 98.2 F | WEIGHT: 246.1 LBS | BODY MASS INDEX: 33.33 KG/M2

## 2024-11-27 DIAGNOSIS — D50.0 IRON DEFICIENCY ANEMIA DUE TO CHRONIC BLOOD LOSS: Primary | ICD-10-CM

## 2024-11-27 PROCEDURE — 99212 OFFICE O/P EST SF 10 MIN: CPT

## 2024-11-27 NOTE — PROGRESS NOTES
[facility]  [address] [place] [address].   [place] [address]        Pt Name: Tone Boykin  YOB: 1955  Date of evaluation: 11/27/2024  Primary Care Physician: Virginia Sommer PA  Reason for evaluation:   Chief Complaint   Patient presents with    Follow-up     Iron deficiency anemia due to chronic blood loss              Subjective: Here for follow up.  Feeling tired and fatigued with decreased exercise tolerance  C/o of joint pain.  Takes occasional Naprosyn      OBJECTIVE:  VITALS:  height is 1.829 m (6') and weight is 111.6 kg (246 lb 1.6 oz). His temperature is 98.2 °F (36.8 °C). His blood pressure is 139/92 (abnormal) and his pulse is 82. His oxygen saturation is 97%.   Physical Exam:  Performance Status: 0  Well developed, well nourished male  EYES: sclera non-icteric   ENT: oropharynx clear.   NECK: No lymphadenopathy.   HEART: Regular rate and rhythm.  LUNGS: Clear.   ABDOMEN: Soft, nontender. No ascites. No mass or organomegaly.  EXTREMITIES: without edema.    NEUROLOGIC: No focal deficits.  SKIN: No Rash.          Medications  Prior to Admission medications    Medication Sig Start Date End Date Taking? Authorizing Provider   hydrocortisone (ANUSOL-HC) 25 MG suppository Place 1 suppository rectally in the morning and 1 suppository in the evening. 10/25/24  Yes Satinder Lentz MD   Tarteddy Jenkins 1200 MG CAPS Take by mouth daily   Yes ProviderMarquise MD   tamsulosin (FLOMAX) 0.4 MG capsule  6/12/23  Yes ProviderMarquise MD   aspirin 81 MG tablet Take 1 tablet by mouth daily   Yes ProviderMarquise MD   Nutritional Supplements (ENSURE ACTIVE PO) Take by mouth   Yes Marquise Mendoza MD   atorvastatin (LIPITOR) 40 MG tablet Take 1 tablet by mouth daily 11/22/18  Yes John Willett MD   lisinopril (PRINIVIL;ZESTRIL) 5 MG tablet Take 1 tablet by mouth daily   Yes Marquise Mendoza MD   clopidogrel (PLAVIX) 75 MG tablet TAKE ONE TABLET BY MOUTH EVERY DAY 10/12/17

## 2025-02-25 RX ORDER — HYDROCORTISONE ACETATE 25 MG/1
25 SUPPOSITORY RECTAL EVERY 12 HOURS
Qty: 10 SUPPOSITORY | Refills: 2 | OUTPATIENT
Start: 2025-02-25

## 2025-02-26 RX ORDER — HYDROCORTISONE ACETATE 25 MG/1
25 SUPPOSITORY RECTAL 2 TIMES DAILY
Qty: 10 SUPPOSITORY | Refills: 1 | Status: SHIPPED | OUTPATIENT
Start: 2025-02-26

## 2025-05-20 ENCOUNTER — HOSPITAL ENCOUNTER (OUTPATIENT)
Dept: INFUSION THERAPY | Age: 70
Discharge: HOME | End: 2025-05-20
Payer: MEDICARE

## 2025-05-20 DIAGNOSIS — D50.0 IRON DEFICIENCY ANEMIA DUE TO CHRONIC BLOOD LOSS: ICD-10-CM

## 2025-05-20 LAB
ALBUMIN SERPL-MCNC: 3.9 G/DL (ref 3.5–5.2)
ALP SERPL-CCNC: 87 U/L (ref 40–129)
ALT SERPL-CCNC: 24 U/L (ref 0–40)
ANION GAP SERPL CALCULATED.3IONS-SCNC: 10 MMOL/L (ref 7–16)
AST SERPL-CCNC: 23 U/L (ref 0–39)
BASOPHILS # BLD: 0.03 K/UL (ref 0–0.2)
BASOPHILS NFR BLD: 1 % (ref 0–2)
BILIRUB SERPL-MCNC: 0.6 MG/DL (ref 0–1.2)
BUN SERPL-MCNC: 19 MG/DL (ref 6–23)
CALCIUM SERPL-MCNC: 9.1 MG/DL (ref 8.6–10.2)
CHLORIDE SERPL-SCNC: 103 MMOL/L (ref 98–107)
CO2 SERPL-SCNC: 24 MMOL/L (ref 22–29)
CREAT SERPL-MCNC: 1.2 MG/DL (ref 0.7–1.2)
EOSINOPHIL # BLD: 0.12 K/UL (ref 0.05–0.5)
EOSINOPHILS RELATIVE PERCENT: 3 % (ref 0–6)
ERYTHROCYTE [DISTWIDTH] IN BLOOD BY AUTOMATED COUNT: 13.4 % (ref 11.5–15)
FERRITIN SERPL-MCNC: 121 NG/ML
GFR, ESTIMATED: 63 ML/MIN/1.73M2
GLUCOSE SERPL-MCNC: 104 MG/DL (ref 74–99)
HCT VFR BLD AUTO: 43.5 % (ref 37–54)
HGB BLD-MCNC: 14.6 G/DL (ref 12.5–16.5)
IMM GRANULOCYTES # BLD AUTO: <0.03 K/UL (ref 0–0.58)
IMM GRANULOCYTES NFR BLD: 0 % (ref 0–5)
IRON SATN MFR SERPL: 30 % (ref 20–55)
IRON SERPL-MCNC: 76 UG/DL (ref 61–157)
LYMPHOCYTES NFR BLD: 1.22 K/UL (ref 1.5–4)
LYMPHOCYTES RELATIVE PERCENT: 28 % (ref 20–42)
MCH RBC QN AUTO: 30 PG (ref 26–35)
MCHC RBC AUTO-ENTMCNC: 33.6 G/DL (ref 32–34.5)
MCV RBC AUTO: 89.5 FL (ref 80–99.9)
MONOCYTES NFR BLD: 0.53 K/UL (ref 0.1–0.95)
MONOCYTES NFR BLD: 12 % (ref 2–12)
NEUTROPHILS NFR BLD: 57 % (ref 43–80)
NEUTS SEG NFR BLD: 2.54 K/UL (ref 1.8–7.3)
PLATELET # BLD AUTO: 257 K/UL (ref 130–450)
PMV BLD AUTO: 9.5 FL (ref 7–12)
POTASSIUM SERPL-SCNC: 4.6 MMOL/L (ref 3.5–5)
PROT SERPL-MCNC: 6.7 G/DL (ref 6.4–8.3)
RBC # BLD AUTO: 4.86 M/UL (ref 3.8–5.8)
SODIUM SERPL-SCNC: 137 MMOL/L (ref 132–146)
TIBC SERPL-MCNC: 253 UG/DL (ref 250–450)
WBC OTHER # BLD: 4.4 K/UL (ref 4.5–11.5)

## 2025-05-20 PROCEDURE — 85025 COMPLETE CBC W/AUTO DIFF WBC: CPT

## 2025-05-20 PROCEDURE — 83540 ASSAY OF IRON: CPT

## 2025-05-20 PROCEDURE — 36415 COLL VENOUS BLD VENIPUNCTURE: CPT

## 2025-05-20 PROCEDURE — 82728 ASSAY OF FERRITIN: CPT

## 2025-05-20 PROCEDURE — 80053 COMPREHEN METABOLIC PANEL: CPT

## 2025-05-20 PROCEDURE — 83550 IRON BINDING TEST: CPT

## 2025-05-21 ENCOUNTER — OFFICE VISIT (OUTPATIENT)
Dept: ONCOLOGY | Age: 70
End: 2025-05-21

## 2025-05-21 VITALS
WEIGHT: 259.3 LBS | OXYGEN SATURATION: 95 % | DIASTOLIC BLOOD PRESSURE: 97 MMHG | HEART RATE: 66 BPM | HEIGHT: 72 IN | TEMPERATURE: 97.7 F | BODY MASS INDEX: 35.12 KG/M2 | SYSTOLIC BLOOD PRESSURE: 153 MMHG

## 2025-05-21 DIAGNOSIS — D50.0 IRON DEFICIENCY ANEMIA DUE TO CHRONIC BLOOD LOSS: Primary | ICD-10-CM

## 2025-05-21 RX ORDER — FERROUS SULFATE 325(65) MG
325 TABLET ORAL
COMMUNITY

## 2025-05-21 NOTE — PROGRESS NOTES
[facility]  [address] [place] [address].   [place] [address]        Pt Name: Tone Boykin  YOB: 1955  Date of evaluation: 5/21/2025  Primary Care Physician: Virginia Sommer PA  Reason for evaluation:   Chief Complaint   Patient presents with    Follow-up     Iron deficiency anemia due to chronic blood loss              Subjective: Here for follow up.  Feeling tired and fatigued with decreased exercise tolerance  C/o of joint pain.  Takes occasional Naprosyn      OBJECTIVE:  VITALS:  height is 1.829 m (6') and weight is 117.6 kg (259 lb 4.8 oz). His temperature is 97.7 °F (36.5 °C). His blood pressure is 153/97 (abnormal) and his pulse is 66. His oxygen saturation is 95%.   Physical Exam:  Performance Status: 0  Well developed, well nourished male  EYES: sclera non-icteric   ENT: oropharynx clear.   NECK: No lymphadenopathy.   HEART: Regular rate and rhythm.  LUNGS: Clear.   ABDOMEN: Soft, nontender. No ascites. No mass or organomegaly.  EXTREMITIES: without edema.    NEUROLOGIC: No focal deficits.  SKIN: No Rash.          Medications  Prior to Admission medications    Medication Sig Start Date End Date Taking? Authorizing Provider   ferrous sulfate (IRON 325) 325 (65 Fe) MG tablet Take 1 tablet by mouth daily (with breakfast)   Yes Marquise Mendoza MD   hydrocortisone (ANUSOL-HC) 25 MG suppository Place 1 suppository rectally 2 times daily 2/26/25  Yes Satinder Lentz MD   hydrocortisone (ANUSOL-HC) 25 MG suppository Place 1 suppository rectally in the morning and 1 suppository in the evening. 10/25/24  Yes Satinder Lentz MD   Tarteddy Jenkins 1200 MG CAPS Take by mouth daily   Yes Marquise Mendoza MD   tamsulosin (FLOMAX) 0.4 MG capsule  6/12/23  Yes Marquise Mendoza MD   aspirin 81 MG tablet Take 1 tablet by mouth daily   Yes Marquise Mendoza MD   Nutritional Supplements (ENSURE ACTIVE PO) Take by mouth   Yes Marquise Mendoza MD   atorvastatin (LIPITOR) 40 MG tablet

## (undated) DEVICE — GOWN ISOLATN REG YEL M WT MULTIPLY SIDETIE LEV 2

## (undated) DEVICE — TRAP SURG QUAD PARABOLA SLOT DSGN SFTY SCRN TRAPEASE

## (undated) DEVICE — VALVE SUCTION AIR H2O HYDR H2O JET CONN STRL ORCA POD + DISP

## (undated) DEVICE — SPONGE GZ 4IN 4IN 4 PLY N WVN AVANT

## (undated) DEVICE — ADAPTER CLEANING PORPOISE CLEANING

## (undated) DEVICE — 6 X 9  1.75MIL 4-WALL LABGUARD: Brand: MINIGRIP COMMERCIAL LLC

## (undated) DEVICE — KENDALL 450 SERIES MONITORING FOAM ELECTRODE - RECTANGULAR SHAPE ( 3/PK): Brand: KENDALL

## (undated) DEVICE — FORCEPS BX L240CM JAW DIA2.8MM L CAP W/ NDL MIC MESH TOOTH

## (undated) DEVICE — KIT BEDSIDE REVITAL OX 500ML

## (undated) DEVICE — MASK,FACE,MAXFLUIDPROTECT,SHIELD/ERLPS: Brand: MEDLINE

## (undated) DEVICE — MEDI-VAC NON-CONDUCTIVE SUCTION TUBING: Brand: CARDINAL HEALTH

## (undated) DEVICE — CONTAINER SPEC COLL 960ML POLYPR TRIANG GRAD INTAKE/OUTPUT

## (undated) DEVICE — JELLY,LUBE,STERILE,FLIP TOP,TUBE,4-OZ: Brand: MEDLINE

## (undated) DEVICE — 4-PORT MANIFOLD: Brand: NEPTUNE 2

## (undated) DEVICE — LUBRICANT SURG JELLY ST BACTER TUBE 4.25OZ